# Patient Record
Sex: FEMALE | Race: BLACK OR AFRICAN AMERICAN | NOT HISPANIC OR LATINO | Employment: FULL TIME | URBAN - METROPOLITAN AREA
[De-identification: names, ages, dates, MRNs, and addresses within clinical notes are randomized per-mention and may not be internally consistent; named-entity substitution may affect disease eponyms.]

---

## 2017-08-15 ENCOUNTER — OFFICE VISIT (OUTPATIENT)
Dept: URGENT CARE | Facility: CLINIC | Age: 22
End: 2017-08-15
Payer: COMMERCIAL

## 2017-08-15 VITALS
SYSTOLIC BLOOD PRESSURE: 122 MMHG | OXYGEN SATURATION: 98 % | BODY MASS INDEX: 42.64 KG/M2 | HEIGHT: 65 IN | HEART RATE: 103 BPM | WEIGHT: 255.94 LBS | DIASTOLIC BLOOD PRESSURE: 70 MMHG | TEMPERATURE: 98 F

## 2017-08-15 DIAGNOSIS — L03.116 CELLULITIS OF LEFT LOWER EXTREMITY: Primary | ICD-10-CM

## 2017-08-15 PROCEDURE — 99214 OFFICE O/P EST MOD 30 MIN: CPT | Mod: S$GLB,,, | Performed by: NURSE PRACTITIONER

## 2017-08-15 PROCEDURE — 99999 PR PBB SHADOW E&M-EST. PATIENT-LVL III: CPT | Mod: PBBFAC,,,

## 2017-08-15 PROCEDURE — 3008F BODY MASS INDEX DOCD: CPT | Mod: S$GLB,,, | Performed by: NURSE PRACTITIONER

## 2017-08-15 RX ORDER — CEPHALEXIN 500 MG/1
500 TABLET ORAL 3 TIMES DAILY
Qty: 21 TABLET | Refills: 0 | Status: SHIPPED | OUTPATIENT
Start: 2017-08-15 | End: 2017-08-22

## 2017-08-15 RX ORDER — MUPIROCIN 20 MG/G
OINTMENT TOPICAL 3 TIMES DAILY
Qty: 15 G | Refills: 0 | Status: SHIPPED | OUTPATIENT
Start: 2017-08-15 | End: 2017-09-25

## 2017-08-15 NOTE — LETTER
August 15, 2017                 [facility]  Urgent Care  [address] [place] [address]  Phone: [phone]  Fax: [phone]   August 15, 2017     Patient: iMrian Carbajal   YOB: 1995   Date of Visit: 8/15/2017       To Whom it May Concern:    Mirian Carbajal was seen in my clinic on 8/15/2017. She may return on 8/16/2017.  If you have any questions or concerns, please don't hesitate to call.    Sincerely,         Patito Ashford NP

## 2017-08-15 NOTE — PROGRESS NOTES
Subjective:       Patient ID: Mirian Carbajal is a 21 y.o. female.    Chief Complaint: Insect Bite    Patient presents with erythema to left lower leg.  Has bite to dorsal left foot.  Reported reported seeing insect to area of the foot when she was bit.  Described insect to be possibly a very small spider.  Did not see anything to left lower leg/ankle.  Reported that she shaved on Sunday with new razor.  Reported also walking through a cemetary to visit family member's grave site.        Insect Bite   This is a new problem. The current episode started in the past 7 days. The problem occurs constantly. The problem has been unchanged. Associated symptoms include a rash. Pertinent negatives include no chills, congestion, coughing, fatigue, headaches, joint swelling, myalgias, sore throat or swollen glands. She has tried nothing for the symptoms.     Review of Systems   Constitutional: Negative for chills and fatigue.   HENT: Negative for congestion and sore throat.    Respiratory: Negative for cough.    Musculoskeletal: Negative for joint swelling and myalgias.   Skin: Positive for color change and rash.   Neurological: Negative for dizziness and headaches.   Psychiatric/Behavioral: Negative for agitation and confusion.       Objective:      Physical Exam   Constitutional: She is oriented to person, place, and time. Vital signs are normal. She appears well-developed and well-nourished.   HENT:   Head: Normocephalic and atraumatic.   Neck: Normal range of motion.   Cardiovascular: Normal rate.    Pulmonary/Chest: Effort normal.   Musculoskeletal: Normal range of motion. She exhibits tenderness.   Neurological: She is alert and oriented to person, place, and time.   Skin: Skin is warm. There is erythema.        Erythematous area noted to left lower extremity (12.0 x 7 cm).  Skin intact.  No drainage.  Warm to palpation.  Another small area noted to dorsal left foot with dark center.  Min erythema noted.     Patient has picture from today to compare (if needed).  [facility]    Psychiatric: She has a normal mood and affect. Her behavior is normal.       Assessment:       1. Cellulitis of left lower extremity        Plan:         Cellulitis of left lower extremity  -     cephalexin (KEFTAB) 500 mg tablet; Take 1 tablet (500 mg total) by mouth 3 (three) times daily.  Dispense: 21 tablet; Refill: 0  -     mupirocin (BACTROBAN) 2 % ointment; Apply topically 3 (three) times daily.  Dispense: 15 g; Refill: 0        Instructed to take all medications as ordered.  Informed if no improvement or symptoms worsens to follow up with primary care physician.  Informed to monitor for worsening signs.  Printed and review after visit summary with patient.

## 2017-08-15 NOTE — PATIENT INSTRUCTIONS
Cellulitis  Cellulitis is an infection of the deep layers of skin. A break in the skin, such as a cut or scratch, can let bacteria under the skin. If the bacteria get to deep layers of the skin, it can be serious. If not treated, cellulitis can get into the bloodstream and lymph nodes. The infection can then spread throughout the body. This causes serious illness.  Cellulitis causes the affected skin to become red, swollen, warm, and sore. The reddened areas have a visible border. An open sore may leak fluid (pus). You may have a fever, chills, and pain.  Cellulitis is treated with antibiotics taken for 7 to 10 days. An open sore may be cleaned and covered with cool wet gauze. Symptoms should get better 1 to 2 days after treatment is started. Make sure to take all the antibiotics for the full number of days until they are gone. Keep taking the medicine even if your symptoms go away.  Home care  Follow these tips:  · Limit the use of the part of your body with cellulitis.   · If the infection is on your leg, keep your leg raised while sitting. This will help to reduce swelling.  · Take all of the antibiotic medicine exactly as directed until it is gone. Do not miss any doses, especially during the first 7 days. Dont stop taking the medicine when your symptoms get better.  · Keep the affected area clean and dry.  · Wash your hands with soap and warm water before and after touching your skin. Anyone else who touches your skin should also wash his or her hands. Don't share towels.  Follow-up care  Follow up with your healthcare provider, or as advised. If your infection does not go away on the first antibiotic, your healthcare provider will prescribe a different one.  When to seek medical advice  Call your healthcare provider right away if any of these occur:  · Red areas that spread  · Swelling or pain that gets worse  · Fluid leaking from the skin (pus)  · Fever higher of 100.4º F (38.0º C) or higher after 2 days  on antibiotics  Date Last Reviewed: 9/1/2016  © 0565-8395 [employer]. [address] [place] [address], [place] [address]. All rights reserved. This information is not intended as a substitute for professional medical care. Always follow your healthcare professional's instructions.

## 2017-09-25 ENCOUNTER — OFFICE VISIT (OUTPATIENT)
Dept: INTERNAL MEDICINE | Facility: CLINIC | Age: 22
End: 2017-09-25
Payer: COMMERCIAL

## 2017-09-25 VITALS
HEIGHT: 65 IN | SYSTOLIC BLOOD PRESSURE: 130 MMHG | OXYGEN SATURATION: 98 % | TEMPERATURE: 98 F | BODY MASS INDEX: 43.16 KG/M2 | WEIGHT: 259.06 LBS | DIASTOLIC BLOOD PRESSURE: 66 MMHG | HEART RATE: 88 BPM

## 2017-09-25 DIAGNOSIS — N92.6 ABNORMAL MENSTRUAL CYCLE: ICD-10-CM

## 2017-09-25 DIAGNOSIS — R10.9 ABDOMINAL PAIN, UNSPECIFIED LOCATION: ICD-10-CM

## 2017-09-25 DIAGNOSIS — R42 DIZZINESS: ICD-10-CM

## 2017-09-25 DIAGNOSIS — R51.9 HEADACHE, UNSPECIFIED HEADACHE TYPE: ICD-10-CM

## 2017-09-25 DIAGNOSIS — Z00.00 EXAMINATION: Primary | ICD-10-CM

## 2017-09-25 PROCEDURE — 99999 PR PBB SHADOW E&M-EST. PATIENT-LVL IV: CPT | Mod: PBBFAC,,, | Performed by: NURSE PRACTITIONER

## 2017-09-25 PROCEDURE — 3008F BODY MASS INDEX DOCD: CPT | Mod: S$GLB,,, | Performed by: NURSE PRACTITIONER

## 2017-09-25 PROCEDURE — 99395 PREV VISIT EST AGE 18-39: CPT | Mod: S$GLB,,, | Performed by: NURSE PRACTITIONER

## 2017-09-25 RX ORDER — IBUPROFEN 800 MG/1
800 TABLET ORAL 3 TIMES DAILY PRN
Qty: 30 TABLET | Refills: 0 | Status: SHIPPED | OUTPATIENT
Start: 2017-09-25 | End: 2017-10-05

## 2017-09-25 NOTE — PROGRESS NOTES
Subjective:       Patient ID: Mirian Carbajal is a 22 y.o. female.    Chief Complaint: check up, comlete Bloodwork and referral to OB/GYN    Patient presents for exam and labs.  Reports that she has not been feeling well.  Has been having some fatigue and dizziness.  Has history of headaches but yes that they have been more frequent lately.  Last year she had MRI done at [facility] and was negative per patient.  Has seen neurology at [facility] about 2 years ago.  Reports she was giving some medication by mouth that did not improve symptoms.  Did not return for follow up.  Reports she has trouble staying asleep during the night.  Unaware if she snores.  No problem falling asleep.  Her mother-in-law and father-in-law passed away in the last few months.  Denies any other stressful situations.        Review of Systems   Constitutional: Positive for unexpected weight change. Negative for activity change.   HENT: Negative for hearing loss, rhinorrhea and trouble swallowing.    Eyes: Negative for discharge and visual disturbance.   Respiratory: Negative for chest tightness and wheezing.    Cardiovascular: Positive for palpitations. Negative for chest pain.   Gastrointestinal: Positive for constipation and diarrhea. Negative for blood in stool and vomiting.   Endocrine: Positive for polyuria. Negative for polydipsia.   Genitourinary: Positive for menstrual problem (abnormal cycles). Negative for difficulty urinating, dysuria and hematuria.   Musculoskeletal: Positive for arthralgias, joint swelling and neck pain.   Neurological: Positive for weakness and headaches.   Psychiatric/Behavioral: Positive for dysphoric mood. Negative for confusion.       Objective:      Physical Exam   Constitutional: She is oriented to person, place, and time. Vital signs are normal. She appears well-developed and well-nourished.   HENT:   Head: Normocephalic and atraumatic.   Right Ear: Hearing, tympanic membrane,  external ear and ear canal normal.   Left Ear: Hearing, tympanic membrane, external ear and ear canal normal.   Nose: Nose normal. Right sinus exhibits no maxillary sinus tenderness and no frontal sinus tenderness. Left sinus exhibits no maxillary sinus tenderness and no frontal sinus tenderness.   Mouth/Throat: Uvula is midline and oropharynx is clear and moist.   Neck: Normal range of motion.   Cardiovascular: Normal rate and regular rhythm.    Pulses:       Radial pulses are 2+ on the right side, and 2+ on the left side.        Dorsalis pedis pulses are 2+ on the right side, and 2+ on the left side.   Pulmonary/Chest: Effort normal and breath sounds normal.   Abdominal: Soft. Bowel sounds are normal. There is tenderness in the right lower quadrant and periumbilical area.   Musculoskeletal: Normal range of motion.   Neurological: She is alert and oriented to person, place, and time.   Skin: Skin is warm and dry. No erythema.   Psychiatric: She has a normal mood and affect. Her behavior is normal.       Assessment:       1. Examination    2. Headache, unspecified headache type    3. Dizziness    4. Abdominal pain, unspecified location    5. Abnormal menstrual cycle        Plan:         Examination  -     CBC auto differential; Future; Expected date: 09/25/2017  -     Comprehensive metabolic panel; Future; Expected date: 09/25/2017  -     TSH; Future; Expected date: 09/25/2017  -     Vitamin D; Future; Expected date: 09/25/2017  -     Lipid panel; Future; Expected date: 09/25/2017  -     Hemoglobin A1c; Future; Expected date: 09/25/2017  -     Urinalysis; Future; Expected date: 09/25/2017  -     PREGNANCY TEST, URINE RAPID; Future; Expected date: 09/25/2017  -     Ambulatory referral to Obstetrics / Gynecology    Headache, unspecified headache type  -     CBC auto differential; Future; Expected date: 09/25/2017  -     Comprehensive metabolic panel; Future; Expected date: 09/25/2017  -     TSH; Future; Expected date:  09/25/2017  -     Vitamin D; Future; Expected date: 09/25/2017  -     ibuprofen (ADVIL,MOTRIN) 800 MG tablet; Take 1 tablet (800 mg total) by mouth 3 (three) times daily as needed for Pain.  Dispense: 30 tablet; Refill: 0    Dizziness  -     CBC auto differential; Future; Expected date: 09/25/2017  -     Comprehensive metabolic panel; Future; Expected date: 09/25/2017  -     TSH; Future; Expected date: 09/25/2017  -     Vitamin D; Future; Expected date: 09/25/2017    Abdominal pain, unspecified location  -     Urinalysis; Future; Expected date: 09/25/2017  -     PREGNANCY TEST, URINE RAPID; Future; Expected date: 09/25/2017    Abnormal menstrual cycle  -     Ambulatory referral to Obstetrics / Gynecology      Instructed to take all medications as ordered.  Informed if no improvement or symptoms worsens to follow up with primary care physician.  Will inform of lab reports.  Will get appointment to see GYN and Pulmonary.  Printed and review after visit summary with patient.

## 2017-09-26 ENCOUNTER — LAB VISIT (OUTPATIENT)
Dept: LAB | Facility: HOSPITAL | Age: 22
End: 2017-09-26
Attending: NURSE PRACTITIONER
Payer: COMMERCIAL

## 2017-09-26 DIAGNOSIS — Z00.00 EXAMINATION: ICD-10-CM

## 2017-09-26 DIAGNOSIS — R42 DIZZINESS: ICD-10-CM

## 2017-09-26 DIAGNOSIS — R51.9 HEADACHE, UNSPECIFIED HEADACHE TYPE: ICD-10-CM

## 2017-09-26 LAB
25(OH)D3+25(OH)D2 SERPL-MCNC: 11 NG/ML
ALBUMIN SERPL BCP-MCNC: 3.4 G/DL
ALP SERPL-CCNC: 71 U/L
ALT SERPL W/O P-5'-P-CCNC: 35 U/L
ANION GAP SERPL CALC-SCNC: 8 MMOL/L
AST SERPL-CCNC: 46 U/L
BASOPHILS # BLD AUTO: 0.03 K/UL
BASOPHILS NFR BLD: 0.5 %
BILIRUB SERPL-MCNC: 0.2 MG/DL
BUN SERPL-MCNC: 15 MG/DL
CALCIUM SERPL-MCNC: 8.8 MG/DL
CHLORIDE SERPL-SCNC: 107 MMOL/L
CHOLEST SERPL-MCNC: 182 MG/DL
CHOLEST/HDLC SERPL: 5.4 {RATIO}
CO2 SERPL-SCNC: 24 MMOL/L
CREAT SERPL-MCNC: 0.9 MG/DL
DIFFERENTIAL METHOD: ABNORMAL
EOSINOPHIL # BLD AUTO: 0.3 K/UL
EOSINOPHIL NFR BLD: 4.8 %
ERYTHROCYTE [DISTWIDTH] IN BLOOD BY AUTOMATED COUNT: 15.2 %
EST. GFR  (AFRICAN AMERICAN): >60 ML/MIN/1.73 M^2
EST. GFR  (NON AFRICAN AMERICAN): >60 ML/MIN/1.73 M^2
ESTIMATED AVG GLUCOSE: 114 MG/DL
GLUCOSE SERPL-MCNC: 80 MG/DL
HBA1C MFR BLD HPLC: 5.6 %
HCT VFR BLD AUTO: 34.2 %
HDLC SERPL-MCNC: 34 MG/DL
HDLC SERPL: 18.7 %
HGB BLD-MCNC: 10.8 G/DL
LDLC SERPL CALC-MCNC: 123.4 MG/DL
LYMPHOCYTES # BLD AUTO: 2.1 K/UL
LYMPHOCYTES NFR BLD: 34.7 %
MCH RBC QN AUTO: 24.6 PG
MCHC RBC AUTO-ENTMCNC: 31.6 G/DL
MCV RBC AUTO: 78 FL
MONOCYTES # BLD AUTO: 0.5 K/UL
MONOCYTES NFR BLD: 8.6 %
NEUTROPHILS # BLD AUTO: 3.1 K/UL
NEUTROPHILS NFR BLD: 50.7 %
NONHDLC SERPL-MCNC: 148 MG/DL
PLATELET # BLD AUTO: 391 K/UL
PMV BLD AUTO: 9.7 FL
POTASSIUM SERPL-SCNC: 4.3 MMOL/L
PROT SERPL-MCNC: 7.3 G/DL
RBC # BLD AUTO: 4.39 M/UL
SODIUM SERPL-SCNC: 139 MMOL/L
TRIGL SERPL-MCNC: 123 MG/DL
TSH SERPL DL<=0.005 MIU/L-ACNC: 2.34 UIU/ML
WBC # BLD AUTO: 6.03 K/UL

## 2017-09-26 PROCEDURE — 80053 COMPREHEN METABOLIC PANEL: CPT

## 2017-09-26 PROCEDURE — 85025 COMPLETE CBC W/AUTO DIFF WBC: CPT

## 2017-09-26 PROCEDURE — 82306 VITAMIN D 25 HYDROXY: CPT

## 2017-09-26 PROCEDURE — 83036 HEMOGLOBIN GLYCOSYLATED A1C: CPT

## 2017-09-26 PROCEDURE — 84443 ASSAY THYROID STIM HORMONE: CPT

## 2017-09-26 PROCEDURE — 36415 COLL VENOUS BLD VENIPUNCTURE: CPT | Mod: PO

## 2017-09-26 PROCEDURE — 80061 LIPID PANEL: CPT

## 2017-09-27 ENCOUNTER — TELEPHONE (OUTPATIENT)
Dept: INTERNAL MEDICINE | Facility: CLINIC | Age: 22
End: 2017-09-27

## 2017-09-27 DIAGNOSIS — E55.9 VITAMIN D INSUFFICIENCY: Primary | ICD-10-CM

## 2017-09-27 RX ORDER — ERGOCALCIFEROL 1.25 MG/1
50000 CAPSULE ORAL
Qty: 8 CAPSULE | Refills: 0 | Status: SHIPPED | OUTPATIENT
Start: 2017-09-27 | End: 2017-11-16

## 2017-09-27 NOTE — TELEPHONE ENCOUNTER
----- Message from Melida Crow LPN sent at 9/27/2017  1:29 PM CDT -----  Contact: Pt      ----- Message -----  From: Yakov Davison  Sent: 9/27/2017   1:27 PM  To: Dejon Caputo Staff    Pt is returning a missed call.  Please advise [phone] (home)

## 2017-10-05 ENCOUNTER — LAB VISIT (OUTPATIENT)
Dept: LAB | Facility: HOSPITAL | Age: 22
End: 2017-10-05
Attending: OBSTETRICS & GYNECOLOGY
Payer: COMMERCIAL

## 2017-10-05 ENCOUNTER — OFFICE VISIT (OUTPATIENT)
Dept: OBSTETRICS AND GYNECOLOGY | Facility: CLINIC | Age: 22
End: 2017-10-05
Payer: COMMERCIAL

## 2017-10-05 VITALS
WEIGHT: 259.5 LBS | HEIGHT: 65 IN | DIASTOLIC BLOOD PRESSURE: 70 MMHG | BODY MASS INDEX: 43.24 KG/M2 | SYSTOLIC BLOOD PRESSURE: 120 MMHG

## 2017-10-05 DIAGNOSIS — Z12.4 PAP SMEAR FOR CERVICAL CANCER SCREENING: Primary | ICD-10-CM

## 2017-10-05 DIAGNOSIS — Z11.3 SCREEN FOR STD (SEXUALLY TRANSMITTED DISEASE): ICD-10-CM

## 2017-10-05 DIAGNOSIS — N92.6 IRREGULAR MENSES: ICD-10-CM

## 2017-10-05 LAB
FSH SERPL-ACNC: 7.1 MIU/ML
PROLACTIN SERPL IA-MCNC: 4.8 NG/ML

## 2017-10-05 PROCEDURE — 83001 ASSAY OF GONADOTROPIN (FSH): CPT

## 2017-10-05 PROCEDURE — 87591 N.GONORRHOEAE DNA AMP PROB: CPT

## 2017-10-05 PROCEDURE — 99999 PR PBB SHADOW E&M-EST. PATIENT-LVL III: CPT | Mod: PBBFAC,,, | Performed by: OBSTETRICS & GYNECOLOGY

## 2017-10-05 PROCEDURE — 88175 CYTOPATH C/V AUTO FLUID REDO: CPT | Performed by: PATHOLOGY

## 2017-10-05 PROCEDURE — 84146 ASSAY OF PROLACTIN: CPT

## 2017-10-05 PROCEDURE — 99385 PREV VISIT NEW AGE 18-39: CPT | Mod: S$GLB,,, | Performed by: OBSTETRICS & GYNECOLOGY

## 2017-10-05 PROCEDURE — 81025 URINE PREGNANCY TEST: CPT | Mod: S$GLB,,, | Performed by: OBSTETRICS & GYNECOLOGY

## 2017-10-05 PROCEDURE — 87624 HPV HI-RISK TYP POOLED RSLT: CPT

## 2017-10-05 PROCEDURE — 36415 COLL VENOUS BLD VENIPUNCTURE: CPT | Mod: PO

## 2017-10-05 PROCEDURE — 88141 CYTOPATH C/V INTERPRET: CPT | Mod: ,,, | Performed by: PATHOLOGY

## 2017-10-05 RX ORDER — MEDROXYPROGESTERONE ACETATE 10 MG/1
10 TABLET ORAL DAILY
Qty: 10 TABLET | Refills: 0 | Status: SHIPPED | OUTPATIENT
Start: 2017-10-05 | End: 2018-12-06

## 2017-10-05 RX ORDER — NORGESTIMATE AND ETHINYL ESTRADIOL 0.25-0.035
1 KIT ORAL DAILY
Qty: 30 TABLET | Refills: 11 | Status: SHIPPED | OUTPATIENT
Start: 2017-10-05 | End: 2018-12-06

## 2017-10-05 NOTE — PROGRESS NOTES
Subjective:       Patient ID: Mirian Carbajal is a 22 y.o. female.    Chief Complaint:  heavy menstrual and Gynecologic Exam      History of Present Illness  HPI  Annual Exam-Premenopausal  Patient presents for annual exam. The patient has no complaints today. The patient is sexually active with .  Has not been using any BC for 2 years.  Is not trying for pregnancy. GYN screening history: no prior history of gyn screening tests. The patient wears seatbelts: yes. The patient participates in regular exercise: yes. Has the patient ever been transfused or tattooed?: no. The patient reports that there is not domestic violence in her life.        Dysfunctional Uterine Bleeding  Patient complains of irregular menses. She had been bleeding irregularly and frequently skips months at a time. She is now bleeding every 2-3 months and menses are lasting 14-30 days. She changes her pad or tampon every 2 hours.  Dysmenorrhea:moderate, occurring throughout menses. Cyclic symptoms include: none. Current contraception: none. History of infertility: no. History of abnormal Pap smear: no.         GYN & OB HistoryPatient's last menstrual period was 07/02/2017 (approximate).   Date of Last Pap: No result found    OB History   No data available       Review of Systems  Review of Systems   Constitutional: Negative for activity change, appetite change, fatigue, fever and unexpected weight change.   Respiratory: Negative for shortness of breath.    Cardiovascular: Negative for chest pain, palpitations and leg swelling.   Gastrointestinal: Negative for abdominal pain, constipation, diarrhea, nausea and vomiting.   Genitourinary: Positive for menorrhagia and menstrual problem. Negative for dyspareunia, dysuria, frequency, genital sores, hematuria, pelvic pain, vaginal bleeding, vaginal discharge, vaginal pain, dysmenorrhea and vaginal odor.   Musculoskeletal: Negative for back pain.   Neurological: Negative for syncope and  headaches.   Breast: Negative for breast mass, breast pain, nipple discharge and skin changes          Objective:    Physical Exam:   Constitutional: She is oriented to person, place, and time. She appears well-developed and well-nourished. No distress.    HENT:   Head: Normocephalic and atraumatic.    Eyes: EOM are normal. Pupils are equal, round, and reactive to light.    Neck: Normal range of motion. Neck supple.    Cardiovascular: Normal rate, regular rhythm and normal heart sounds.     Pulmonary/Chest: Effort normal and breath sounds normal.        Abdominal: Soft. Bowel sounds are normal. She exhibits no distension. There is no tenderness.     Genitourinary: Vagina normal and uterus normal. Pelvic exam was performed with patient supine. There is no rash, tenderness, lesion or injury on the right labia. There is no rash, tenderness, lesion or injury on the left labia. Uterus is not deviated, not enlarged and not tender. Cervix is normal. Right adnexum displays no mass, no tenderness and no fullness. Left adnexum displays no mass, no tenderness and no fullness. No erythema, tenderness or bleeding in the vagina. No foreign body in the vagina. No signs of injury around the vagina. No vaginal discharge found. Cervix exhibits no motion tenderness, no discharge and no friability. Additional cervical findings: pap smear done  Genitourinary Comments: UPT today Negative           Musculoskeletal: Normal range of motion and moves all extremeties. She exhibits no edema or tenderness.       Neurological: She is alert and oriented to person, place, and time.    Skin: Skin is warm and dry.    Psychiatric: She has a normal mood and affect. Her behavior is normal. Thought content normal.          Assessment:        1. Pap smear for cervical cancer screening    2. Screen for STD (sexually transmitted disease)    3. Irregular menses             Plan:      Pap smear for cervical cancer screening  -     Liquid-based pap smear,  screening  -     Pt was counseled on cervical/vaginal screening guidelines and recommendations.  If today's pap smear result is negative, next pap smear will be due in 2020.  -     Pt was advised on current breast cancer screening recommendations.    -     Follow up with PCP for routine health maintenance needs.    Screen for STD (sexually transmitted disease)  -     C. trachomatis/N. gonorrhoeae by AMP DNA Cervix    Irregular menses  -     FOLLICLE STIMULATING HORMONE; Future; Expected date: 10/05/2017  -     Prolactin; Future; Expected date: 10/05/2017  -     POCT urine pregnancy  -     Clinical presentation suggestive of chronic anovulation/PCOS.  Pt was counseled on PCOS and on treatment options, including associated risks and benefits of each.  Pt voiced understanding and desires to proceed with Provera challenge followed by OCP.  Medication dosing, side-effects, risks, benefits, and alternatives were discussed.  Medical history was reviewed and pt is a candidate for OCP use.  -     medroxyPROGESTERone (PROVERA) 10 MG tablet; Take 1 tablet (10 mg total) by mouth once daily.  Dispense: 10 tablet; Refill: 0  -     norgestimate-ethinyl estradiol (ORTHO-CYCLEN) 0.25-35 mg-mcg per tablet; Take 1 tablet by mouth once daily.  Dispense: 30 tablet; Refill: 11  -     Pt counseled on link between obesity and chronic anovulation/PCOS.  Recommend healthy diet and exercise with goal 20% weight loss.        Return in about 3 months (around 1/5/2018).

## 2017-10-05 NOTE — PATIENT INSTRUCTIONS
Polycystic Ovary Syndrome  Polycystic ovary syndrome (PCOS) causes harmless, small cysts in the ovaries and other symptoms. PCOS is caused by certain hormones being out of balance. The word syndrome means a group of symptoms. Women with PCOS may have no periods, irregular periods, or very long periods.    Your ovaries  Women store their eggs in their ovaries. Each egg is in a capsule called a follicle. Normally during the reproductive years, one follicle grows to produce a mature egg each month. This egg is released during ovulation and the follicle dissolves.  Hormones out of balance  With polycystic ovary syndrome (PCOS), the hormones that control ovulation are out of balance. These include estrogen, progesterones, and androgen. As a result, ovulation may not occur. Instead, the follicle stays enlarged. This is a fluid-filled sac (cyst). Over time, the ovaries fill with many small cysts. This is why they are called poly (many) cystic ovaries. In some women, the ovaries also make too much androgen.  PCOS symptoms  Women with PCOS may also have one or more of these symptoms:  · Trouble getting pregnant (fertility problems)  · Weight gain, especially around the waist   · Acne  · Hair growth on the face and other parts of the body  · Patches of thickened, velvety, darkened skin called acanthosis nigricans  Women with PCOS are also at increased risk of developing cancer of the uterine lining, diabetes, and heart disease.   Date Last Reviewed: 5/10/2015  © 1696-6320 [employer]. [address] [place] [address]. All rights reserved. This information is not intended as a substitute for professional medical care. Always follow your healthcare professional's instructions.        Birth Control: The Pill    Birth control pills contain hormones that help prevent pregnancy. The pills are prescribed by your healthcare provider. There are many types of birth control pills available. If you have  side effects from one type of pill, tell your healthcare provider. He or she may be able to prescribe a pill that works better for you.  Pregnancy rates  Talk to your healthcare provider about the effectiveness of this birth control method.  Using the pill  · Take one pill daily. Take it at around the same time each day.  · Follow your healthcare providers guidelines on when to start your first pack of pills. You may need to use another form of birth control for a week or more after you start.  · Know what to do if you forget to take a pill. (Consult your healthcare provider or check the package.) If you miss more than one pill, you may need to use a backup method of birth control for a week or more.  Pros  · Low pregnancy rate  · No interruption to sex  · Easy to use  · Can help make periods more regular  · May lower your risk of ovarian cysts and certain cancers  · May decrease menstrual cramps, menstrual flow, and acne  Cons  · Does not protect against sexually transmitted infection (STIs)  · Requires taking a pill on time each day  · May not work as well when taken with certain other medicines (check with your pharmacist)  · May cause side effects such as nausea, irregular bleeding, headaches, breast tenderness, fatigue, or mood changes (these often go away within 3 months)  · May increase the risk of blood clots, heart attack, and stroke  The pill may not be for you  The pill may not be for you if:  · You are a smoker and over age 35  · You have high blood pressure or gallbladder, liver, cerebrovascular  or heart disease  · You have diabetes, migraines, blood clot in the vein or artery, lupus, depression, certain lipid disorders, or take medicines that interfere with the pill  In these cases, discuss the risks with your healthcare provider.  Date Last Reviewed: 3/1/2017  © 9420-0309 [employer]. [address] [place] [address], [place] [address]. All rights reserved. This information is not intended as a  substitute for professional medical care. Always follow your healthcare professional's instructions.

## 2017-10-05 NOTE — LETTER
October 5, 2017      Patito Ashford, ANGELES  [address] [facility] [address] [place] [address]           [facility] - OB/ GYN  [address] [place] [address]  Phone: [phone]  Fax: [phone]          Patient: Mriian Carbajal   MR Number: [id number]   YOB: 1995   Date of Visit: 10/5/2017       Dear Patito Ashford:    Thank you for referring Mirian Carbajal to me for evaluation. Attached you will find relevant portions of my assessment and plan of care.    If you have questions, please do not hesitate to call me. I look forward to following Mirian Carbajal along with you.    Sincerely,    Fawad Moreira MD    Enclosure  CC:  No Recipients    If you would like to receive this communication electronically, please contact [email or web address] or [phone] to request more information on [employer] access.    For providers and/or their staff who would like to refer a patient to [facility], please contact us through our one-stop-shop provider referral line, [facility], at [phone].    If you feel you have received this communication in error or would no longer like to receive these types of communications, please e-mail [email or web address]

## 2017-10-06 LAB
C TRACH DNA SPEC QL NAA+PROBE: NOT DETECTED
N GONORRHOEA DNA SPEC QL NAA+PROBE: NOT DETECTED

## 2017-10-09 ENCOUNTER — TELEPHONE (OUTPATIENT)
Dept: OBSTETRICS AND GYNECOLOGY | Facility: CLINIC | Age: 22
End: 2017-10-09

## 2017-10-09 NOTE — TELEPHONE ENCOUNTER
Returned patient call regarding how to take medications. Per Dr. Moreira's progress note; patient is to take Provera for 10 days then once finished to start the OCP. Patient verbalized understanding. Patient was instructed to call the clinic back if she had any other questions or concerns.

## 2017-10-12 LAB
HPV HR 12 DNA CVX QL NAA+PROBE: POSITIVE
HPV16 DNA SPEC QL NAA+PROBE: NEGATIVE
HPV18 DNA SPEC QL NAA+PROBE: NEGATIVE

## 2017-10-13 ENCOUNTER — TELEPHONE (OUTPATIENT)
Dept: OBSTETRICS AND GYNECOLOGY | Facility: CLINIC | Age: 22
End: 2017-10-13

## 2017-10-13 NOTE — TELEPHONE ENCOUNTER
----- Message from Jacquelyn Davison sent at 10/13/2017 10:30 AM CDT -----  Patient states she have questions regarding her test results. Please adv/call [phone].//thanks. cw

## 2017-10-13 NOTE — TELEPHONE ENCOUNTER
Patient got her pap smear result on my [facility], she wanted clarification on her HPV level.  Please clarify.  Also should she repeat pap smear next year or Colposcopy?

## 2017-10-13 NOTE — TELEPHONE ENCOUNTER
Called and spoke with pt.  Reviewed pap results (ASCUS HPV +).  As per current guidelines, recommend repeating pap in 12 months.  Pt was counseled on result, HPV, and recommendations.  Pt voiced understanding and questions were answered.

## 2017-10-19 ENCOUNTER — TELEPHONE (OUTPATIENT)
Dept: OBSTETRICS AND GYNECOLOGY | Facility: CLINIC | Age: 22
End: 2017-10-19

## 2017-10-19 NOTE — TELEPHONE ENCOUNTER
----- Message from Rolly Loomis sent at 10/19/2017  9:30 AM CDT -----  Contact: [phone]   Pt would like to know how to take Birth Control pills.  Pt unsure on how to take pills.  Please call back at [phone]. Thx_AH

## 2017-11-17 ENCOUNTER — TELEPHONE (OUTPATIENT)
Dept: OBSTETRICS AND GYNECOLOGY | Facility: CLINIC | Age: 22
End: 2017-11-17

## 2017-11-17 NOTE — TELEPHONE ENCOUNTER
Patient stated that she took provera for the 10 days that she was suppose to take it, her period then came on after the completion.  She started taking the birth control the Sunday after the period started.  She has been bleeding ever since and wanted to know what she should do.  I informed the patient to continue to take the birth control it takes awhile for her body to adjust to the medication, to continue to watch the bleeding if it continues to be bothersome or changing pads every hour for several hours she should either got to ER or come in for evaluation.  Patient voiced understanding.

## 2017-11-17 NOTE — TELEPHONE ENCOUNTER
----- Message from Josefa Blake sent at 11/17/2017 10:33 AM CST -----  Contact: pt  Please call pt @ [phone], pt have a question.

## 2017-11-18 ENCOUNTER — PATIENT MESSAGE (OUTPATIENT)
Dept: OBSTETRICS AND GYNECOLOGY | Facility: CLINIC | Age: 22
End: 2017-11-18

## 2017-11-20 DIAGNOSIS — N92.6 IRREGULAR MENSES: ICD-10-CM

## 2017-11-20 RX ORDER — NORGESTIMATE AND ETHINYL ESTRADIOL 0.25-0.035
1 KIT ORAL DAILY
Qty: 30 TABLET | Refills: 11 | OUTPATIENT
Start: 2017-11-20 | End: 2018-11-20

## 2018-12-06 ENCOUNTER — LAB VISIT (OUTPATIENT)
Dept: LAB | Facility: HOSPITAL | Age: 23
End: 2018-12-06
Attending: FAMILY MEDICINE
Payer: COMMERCIAL

## 2018-12-06 ENCOUNTER — OFFICE VISIT (OUTPATIENT)
Dept: INTERNAL MEDICINE | Facility: CLINIC | Age: 23
End: 2018-12-06
Payer: COMMERCIAL

## 2018-12-06 VITALS
WEIGHT: 266.56 LBS | DIASTOLIC BLOOD PRESSURE: 68 MMHG | HEART RATE: 78 BPM | HEIGHT: 66 IN | SYSTOLIC BLOOD PRESSURE: 122 MMHG | TEMPERATURE: 97 F | BODY MASS INDEX: 42.84 KG/M2

## 2018-12-06 DIAGNOSIS — Z00.00 ROUTINE GENERAL MEDICAL EXAMINATION AT A HEALTH CARE FACILITY: Primary | ICD-10-CM

## 2018-12-06 DIAGNOSIS — Z00.00 ROUTINE GENERAL MEDICAL EXAMINATION AT A HEALTH CARE FACILITY: ICD-10-CM

## 2018-12-06 DIAGNOSIS — E55.9 VITAMIN D DEFICIENCY: ICD-10-CM

## 2018-12-06 DIAGNOSIS — D57.3 SICKLE CELL TRAIT: ICD-10-CM

## 2018-12-06 LAB
25(OH)D3+25(OH)D2 SERPL-MCNC: 8 NG/ML
ALBUMIN SERPL BCP-MCNC: 3.8 G/DL
ALP SERPL-CCNC: 70 U/L
ALT SERPL W/O P-5'-P-CCNC: 34 U/L
ANION GAP SERPL CALC-SCNC: 9 MMOL/L
AST SERPL-CCNC: 46 U/L
BASOPHILS # BLD AUTO: 0.05 K/UL
BASOPHILS NFR BLD: 0.8 %
BILIRUB SERPL-MCNC: 0.2 MG/DL
BUN SERPL-MCNC: 8 MG/DL
CALCIUM SERPL-MCNC: 9.3 MG/DL
CHLORIDE SERPL-SCNC: 108 MMOL/L
CHOLEST SERPL-MCNC: 190 MG/DL
CHOLEST/HDLC SERPL: 5 {RATIO}
CO2 SERPL-SCNC: 24 MMOL/L
CREAT SERPL-MCNC: 0.8 MG/DL
DIFFERENTIAL METHOD: ABNORMAL
EOSINOPHIL # BLD AUTO: 0.3 K/UL
EOSINOPHIL NFR BLD: 3.8 %
ERYTHROCYTE [DISTWIDTH] IN BLOOD BY AUTOMATED COUNT: 15.5 %
EST. GFR  (AFRICAN AMERICAN): >60 ML/MIN/1.73 M^2
EST. GFR  (NON AFRICAN AMERICAN): >60 ML/MIN/1.73 M^2
ESTIMATED AVG GLUCOSE: 114 MG/DL
GLUCOSE SERPL-MCNC: 76 MG/DL
HBA1C MFR BLD HPLC: 5.6 %
HCT VFR BLD AUTO: 38.1 %
HDLC SERPL-MCNC: 38 MG/DL
HDLC SERPL: 20 %
HGB BLD-MCNC: 12 G/DL
IMM GRANULOCYTES # BLD AUTO: 0.01 K/UL
IMM GRANULOCYTES NFR BLD AUTO: 0.2 %
LDLC SERPL CALC-MCNC: 108.6 MG/DL
LYMPHOCYTES # BLD AUTO: 2.3 K/UL
LYMPHOCYTES NFR BLD: 34.6 %
MCH RBC QN AUTO: 25.2 PG
MCHC RBC AUTO-ENTMCNC: 31.5 G/DL
MCV RBC AUTO: 80 FL
MONOCYTES # BLD AUTO: 0.6 K/UL
MONOCYTES NFR BLD: 9.5 %
NEUTROPHILS # BLD AUTO: 3.4 K/UL
NEUTROPHILS NFR BLD: 51.1 %
NONHDLC SERPL-MCNC: 152 MG/DL
NRBC BLD-RTO: 0 /100 WBC
PLATELET # BLD AUTO: 394 K/UL
PMV BLD AUTO: 9.8 FL
POTASSIUM SERPL-SCNC: 4 MMOL/L
PROT SERPL-MCNC: 7.6 G/DL
RBC # BLD AUTO: 4.76 M/UL
SODIUM SERPL-SCNC: 141 MMOL/L
T4 FREE SERPL-MCNC: 0.76 NG/DL
TRIGL SERPL-MCNC: 217 MG/DL
TSH SERPL DL<=0.005 MIU/L-ACNC: 1.95 UIU/ML
WBC # BLD AUTO: 6.62 K/UL

## 2018-12-06 PROCEDURE — 90670 PCV13 VACCINE IM: CPT | Mod: S$GLB,,, | Performed by: FAMILY MEDICINE

## 2018-12-06 PROCEDURE — 36415 COLL VENOUS BLD VENIPUNCTURE: CPT | Mod: PO

## 2018-12-06 PROCEDURE — 84439 ASSAY OF FREE THYROXINE: CPT

## 2018-12-06 PROCEDURE — 80061 LIPID PANEL: CPT

## 2018-12-06 PROCEDURE — 82306 VITAMIN D 25 HYDROXY: CPT

## 2018-12-06 PROCEDURE — 80053 COMPREHEN METABOLIC PANEL: CPT

## 2018-12-06 PROCEDURE — 84443 ASSAY THYROID STIM HORMONE: CPT

## 2018-12-06 PROCEDURE — 90686 IIV4 VACC NO PRSV 0.5 ML IM: CPT | Mod: S$GLB,,, | Performed by: FAMILY MEDICINE

## 2018-12-06 PROCEDURE — 99999 PR PBB SHADOW E&M-EST. PATIENT-LVL III: CPT | Mod: PBBFAC,,, | Performed by: FAMILY MEDICINE

## 2018-12-06 PROCEDURE — 90472 IMMUNIZATION ADMIN EACH ADD: CPT | Mod: S$GLB,,, | Performed by: FAMILY MEDICINE

## 2018-12-06 PROCEDURE — 85025 COMPLETE CBC W/AUTO DIFF WBC: CPT

## 2018-12-06 PROCEDURE — 90471 IMMUNIZATION ADMIN: CPT | Mod: S$GLB,,, | Performed by: FAMILY MEDICINE

## 2018-12-06 PROCEDURE — 83036 HEMOGLOBIN GLYCOSYLATED A1C: CPT

## 2018-12-06 PROCEDURE — 99395 PREV VISIT EST AGE 18-39: CPT | Mod: 25,S$GLB,, | Performed by: FAMILY MEDICINE

## 2018-12-06 NOTE — PROGRESS NOTES
Subjective:      Patient ID: Mirian Carbajal is a 23 y.o. female.    Chief Complaint: Establish Care and Annual Exam    Disclaimer:  This note is prepared using voice recognition software and as such is likely to have errors and has not been proof read. Please contact me for questions.     Mirian Carbajal is a 23 y.o. female who presents today to establish care. Works at [employer]. Dr Scarlett enriquez  last year. Needs to schedule.  Is .  Needing to set back up again her next Pap smear.  Does occasionally get vaginal discharge after having intercourse with her  especially after he ejaculates.  Did look this up online and started doing water based douching without since and occasionally a boric acid suppository which has helped.    She recently donated blood and was sent in letter notifying her that she had a sickle cell trait.  She reports that her father has this as well.  She has not had any issues thus far except now she can't donate blood.    She as a child had asthma but has not had any further issues at this time.    Last year when having blood work done was noted be pretty low on her vitamin-D levels.  Was prescribed prescription vitamin-D.  Need to repeat again today.    Is needing a flu shot.  Is also needing pneumonia vaccines due to the sickle cell trait.  Is also potentially overdue for her Tdap but uncertain when her last 1 was.  Believes it was more than 10 years ago.  Originally came from [place].        Lab Results   Component Value Date    WBC 6.03 09/26/2017    HGB 10.8 (L) 09/26/2017    HCT 34.2 (L) 09/26/2017     (H) 09/26/2017    CHOL 182 09/26/2017    TRIG 123 09/26/2017    HDL 34 (L) 09/26/2017    ALT 35 09/26/2017    AST 46 (H) 09/26/2017     09/26/2017    K 4.3 09/26/2017     09/26/2017    CREATININE 0.9 09/26/2017    BUN 15 09/26/2017    CO2 24 09/26/2017    TSH 2.342 09/26/2017    HGBA1C 5.6 09/26/2017       No image results  "found.        Review of Systems   Constitutional: Negative for activity change, appetite change, chills, fatigue and fever.   HENT: Negative for ear pain and trouble swallowing.    Eyes: Negative for pain and visual disturbance.   Respiratory: Negative for cough and shortness of breath.    Cardiovascular: Negative for chest pain and leg swelling.   Gastrointestinal: Negative for abdominal pain, blood in stool, nausea and vomiting.   Endocrine: Negative for cold intolerance and heat intolerance.   Genitourinary: Negative for dysuria, frequency, vaginal bleeding, vaginal discharge and vaginal pain.   Musculoskeletal: Negative for joint swelling, myalgias and neck pain.   Skin: Negative for color change and rash.   Neurological: Negative for dizziness and headaches.   Psychiatric/Behavioral: Negative for behavioral problems and sleep disturbance.     Objective:     Vitals:    12/06/18 1223   BP: 122/68   Pulse: 78   Temp: 97.1 °F (36.2 °C)   TempSrc: Tympanic   Weight: 120.9 kg (266 lb 8.6 oz)   Height: 5' 6" (1.676 m)     Physical Exam   Constitutional: She is oriented to person, place, and time. She appears well-developed and well-nourished.   Morbid obese female   HENT:   Head: Normocephalic and atraumatic.   Right Ear: External ear normal.   Left Ear: External ear normal.   Mouth/Throat: Oropharynx is clear and moist.   Eyes: EOM are normal.   Neck: Normal range of motion. Neck supple. No thyromegaly present.   Cardiovascular: Normal rate and regular rhythm. Exam reveals no gallop and no friction rub.   No murmur heard.  Pulmonary/Chest: Effort normal. No respiratory distress. She has no wheezes. She has no rales.   Abdominal: Soft. Bowel sounds are normal. She exhibits no distension. There is no tenderness. There is no rebound.   Musculoskeletal: Normal range of motion. She exhibits no edema.   Lymphadenopathy:     She has no cervical adenopathy.   Neurological: She is alert and oriented to person, place, and " time.   Skin: Skin is warm and dry. No rash noted.   Psychiatric: She has a normal mood and affect. Her behavior is normal. Judgment and thought content normal.   Vitals reviewed.    Assessment:     1. Routine general medical examination at a health care facility    2. Sickle cell trait    3. Vitamin D deficiency    4. BMI 40.0-44.9, adult      Plan:   Mirian was seen today for establish care and annual exam.    Diagnoses and all orders for this visit:    Routine general medical examination at a health care facility-labs reviewed from prior ordering labs again today for this year's exam.  Update flu shot update Prevnar she will come back in 2 months to receive the Tdap and the Pneumovax.  Will get her sign back up to do her next Pap smear with Gynecology  -     TSH; Future  -     T4, free; Future  -     Lipid panel; Future  -     Hemoglobin A1c; Future  -     Comprehensive metabolic panel; Future  -     CBC auto differential; Future  -     Vitamin D; Future    Sickle cell trait-noted after donating blood needing now pneumonia vaccines  -     TSH; Future  -     T4, free; Future  -     Lipid panel; Future  -     Hemoglobin A1c; Future  -     Comprehensive metabolic panel; Future  -     CBC auto differential; Future  -     Vitamin D; Future    Vitamin D deficiency-previously on prescription supplementation repeating labs today  -     TSH; Future  -     T4, free; Future  -     Lipid panel; Future  -     Hemoglobin A1c; Future  -     Comprehensive metabolic panel; Future  -     CBC auto differential; Future  -     Vitamin D; Future    BMI 40.0-44.9, adult-discussed diet and exercise and weight loss    Other orders  -     (In Office Administered) Pneumococcal Conjugate Vaccine (13 Valent) (IM)  -     Influenza - Quadrivalent (3 years & older) (PF)            Follow-up in about 1 year (around 12/6/2019) for physical with Dr VILLELA.    There are no Patient Instructions on file for this visit.

## 2018-12-08 ENCOUNTER — PATIENT MESSAGE (OUTPATIENT)
Dept: INTERNAL MEDICINE | Facility: CLINIC | Age: 23
End: 2018-12-08

## 2018-12-08 RX ORDER — ERGOCALCIFEROL 1.25 MG/1
50000 CAPSULE ORAL WEEKLY
Qty: 4 CAPSULE | Refills: 11 | Status: SHIPPED | OUTPATIENT
Start: 2018-12-08 | End: 2019-01-07

## 2019-01-30 ENCOUNTER — TELEPHONE (OUTPATIENT)
Dept: URGENT CARE | Facility: CLINIC | Age: 24
End: 2019-01-30

## 2019-05-06 ENCOUNTER — TELEPHONE (OUTPATIENT)
Dept: INTERNAL MEDICINE | Facility: CLINIC | Age: 24
End: 2019-05-06

## 2019-05-06 NOTE — TELEPHONE ENCOUNTER
Called and left message for pt that I could schedule her with Shandra Bunch today here in [place] to please call back and let me know if she can make it or not.

## 2019-05-06 NOTE — TELEPHONE ENCOUNTER
----- Message from Maura Rodriguez sent at 5/6/2019  7:20 AM CDT -----  Contact: patient  Type:  Same Day Appointment Request    Caller is requesting a same day appointment.  Caller declined first available appointment listed below.    Name of Caller: Mirian  When is the first available appointment?no openings  Symptoms:Sinus infection  Best Call Back Number:[phone]  Additional Information:

## 2019-05-07 ENCOUNTER — OFFICE VISIT (OUTPATIENT)
Dept: INTERNAL MEDICINE | Facility: CLINIC | Age: 24
End: 2019-05-07
Payer: COMMERCIAL

## 2019-05-07 ENCOUNTER — PATIENT MESSAGE (OUTPATIENT)
Dept: INTERNAL MEDICINE | Facility: CLINIC | Age: 24
End: 2019-05-07

## 2019-05-07 VITALS
BODY MASS INDEX: 42.55 KG/M2 | HEART RATE: 74 BPM | HEIGHT: 66 IN | SYSTOLIC BLOOD PRESSURE: 118 MMHG | TEMPERATURE: 98 F | WEIGHT: 264.75 LBS | DIASTOLIC BLOOD PRESSURE: 78 MMHG

## 2019-05-07 DIAGNOSIS — J01.90 ACUTE SINUSITIS, RECURRENCE NOT SPECIFIED, UNSPECIFIED LOCATION: Primary | ICD-10-CM

## 2019-05-07 PROCEDURE — 99999 PR PBB SHADOW E&M-EST. PATIENT-LVL III: ICD-10-PCS | Mod: PBBFAC,,, | Performed by: PHYSICIAN ASSISTANT

## 2019-05-07 PROCEDURE — 99213 OFFICE O/P EST LOW 20 MIN: CPT | Mod: S$GLB,,, | Performed by: PHYSICIAN ASSISTANT

## 2019-05-07 PROCEDURE — 3008F BODY MASS INDEX DOCD: CPT | Mod: CPTII,S$GLB,, | Performed by: PHYSICIAN ASSISTANT

## 2019-05-07 PROCEDURE — 3008F PR BODY MASS INDEX (BMI) DOCUMENTED: ICD-10-PCS | Mod: CPTII,S$GLB,, | Performed by: PHYSICIAN ASSISTANT

## 2019-05-07 PROCEDURE — 99999 PR PBB SHADOW E&M-EST. PATIENT-LVL III: CPT | Mod: PBBFAC,,, | Performed by: PHYSICIAN ASSISTANT

## 2019-05-07 PROCEDURE — 99213 PR OFFICE/OUTPT VISIT, EST, LEVL III, 20-29 MIN: ICD-10-PCS | Mod: S$GLB,,, | Performed by: PHYSICIAN ASSISTANT

## 2019-05-07 RX ORDER — FLUTICASONE PROPIONATE 50 MCG
2 SPRAY, SUSPENSION (ML) NASAL DAILY
Qty: 1 BOTTLE | Refills: 0 | Status: SHIPPED | OUTPATIENT
Start: 2019-05-07 | End: 2020-01-14 | Stop reason: SDUPTHER

## 2019-05-07 RX ORDER — AMOXICILLIN 875 MG/1
875 TABLET, FILM COATED ORAL EVERY 12 HOURS
Qty: 20 TABLET | Refills: 0 | Status: SHIPPED | OUTPATIENT
Start: 2019-05-07 | End: 2019-05-17

## 2019-05-07 NOTE — PROGRESS NOTES
"Subjective:       Patient ID: iMrian Carbajal is a 23 y.o. female.    Chief Complaint: Sinusitis (x3 days)    23 year old female c/o sinus sxs X at least 5 days. Pt is new to me. She reports she was sneezing and experiencing congestion but sxs greatly worsened 5 days ago. She reports sinus pressure/pain, dry cough, nasal congestion, and yellow-green rhinorrhea. She reports no sore throat, ear pain, CP, SOB, fever, chills, N/V, rash, or other medical complaints. She has taken Mucinex, Benadryl, Claritin, Tylenol Sinus, and Sudafed without improvement of sxs.    Patient Care Team:  Zoe Alcantar MD as PCP - General (Family Medicine)  Chasity Ching LPN as Care Coordinator (Internal Medicine)    Patient Active Problem List:     Vitamin D deficiency     Sickle cell trait     BMI 40.0-44.9, adult    Review of Systems   Constitutional: Negative for chills and fever.   HENT: Positive for congestion, rhinorrhea, sinus pressure and sneezing. Negative for ear pain and sore throat.    Respiratory: Positive for cough. Negative for shortness of breath.    Cardiovascular: Negative for chest pain, palpitations and leg swelling.   Gastrointestinal: Negative for nausea and vomiting.   Skin: Negative for rash.   Neurological: Negative for dizziness, weakness, numbness and headaches.   Psychiatric/Behavioral: Negative for confusion.       Objective:       Vitals:    05/07/19 1107   BP: 118/78   Pulse: 74   Temp: 97.6 °F (36.4 °C)   TempSrc: Tympanic   Weight: 120.1 kg (264 lb 12.4 oz)   Height: 5' 6" (1.676 m)     Physical Exam   Constitutional: She is oriented to person, place, and time. She appears well-developed and well-nourished. No distress.   HENT:   Head: Normocephalic and atraumatic.   Right Ear: Tympanic membrane and ear canal normal.   Left Ear: Ear canal normal. Tympanic membrane is bulging.   Nose: Right sinus exhibits maxillary sinus tenderness and frontal sinus tenderness. Left sinus exhibits maxillary " sinus tenderness and frontal sinus tenderness.   Mouth/Throat: Oropharynx is clear and moist. No oropharyngeal exudate.   Minimal soft palate erythema; cobblestone appearance of posterior pharynx   Eyes: EOM are normal. No scleral icterus.   Neck: Neck supple.   Cardiovascular: Normal rate and regular rhythm.   Pulmonary/Chest: Effort normal and breath sounds normal. No stridor. No respiratory distress. She has no decreased breath sounds. She has no wheezes. She has no rhonchi. She has no rales.   Musculoskeletal: Normal range of motion. She exhibits no edema.   Lymphadenopathy:     She has no cervical adenopathy.   Neurological: She is alert and oriented to person, place, and time. No cranial nerve deficit.   Skin: Skin is warm and dry. No rash noted.   Psychiatric: She has a normal mood and affect. Her speech is normal and behavior is normal. Thought content normal.       Assessment:       1. Acute sinusitis, recurrence not specified, unspecified location        Plan:         Mirian was seen today for sinusitis.    Diagnoses and all orders for this visit:    Acute sinusitis, recurrence not specified, unspecified location  -     fluticasone propionate (FLONASE) 50 mcg/actuation nasal spray; 2 sprays (100 mcg total) by Each Nare route once daily.  -     amoxicillin (AMOXIL) 875 MG tablet; Take 1 tablet (875 mg total) by mouth every 12 (twelve) hours. for 10 days  Flonase daily. Pt declines steroid shot. Amoxicillin X 10 days. RTC if sxs persist or worsen.    F/u with PCP as recommended for health management.

## 2019-06-08 ENCOUNTER — PATIENT MESSAGE (OUTPATIENT)
Dept: INTERNAL MEDICINE | Facility: CLINIC | Age: 24
End: 2019-06-08

## 2019-10-07 ENCOUNTER — OFFICE VISIT (OUTPATIENT)
Dept: INTERNAL MEDICINE | Facility: CLINIC | Age: 24
End: 2019-10-07
Payer: COMMERCIAL

## 2019-10-07 VITALS
HEART RATE: 82 BPM | TEMPERATURE: 98 F | SYSTOLIC BLOOD PRESSURE: 110 MMHG | BODY MASS INDEX: 40.57 KG/M2 | WEIGHT: 252.44 LBS | HEIGHT: 66 IN | DIASTOLIC BLOOD PRESSURE: 80 MMHG

## 2019-10-07 DIAGNOSIS — K80.50 BILIARY COLIC: ICD-10-CM

## 2019-10-07 DIAGNOSIS — R19.7 DIARRHEA, UNSPECIFIED TYPE: ICD-10-CM

## 2019-10-07 DIAGNOSIS — R10.13 EPIGASTRIC PAIN: Primary | ICD-10-CM

## 2019-10-07 DIAGNOSIS — F41.9 ANXIETY: ICD-10-CM

## 2019-10-07 PROCEDURE — 99999 PR PBB SHADOW E&M-EST. PATIENT-LVL III: CPT | Mod: PBBFAC,,, | Performed by: NURSE PRACTITIONER

## 2019-10-07 PROCEDURE — 3008F BODY MASS INDEX DOCD: CPT | Mod: CPTII,S$GLB,, | Performed by: NURSE PRACTITIONER

## 2019-10-07 PROCEDURE — 3008F PR BODY MASS INDEX (BMI) DOCUMENTED: ICD-10-PCS | Mod: CPTII,S$GLB,, | Performed by: NURSE PRACTITIONER

## 2019-10-07 PROCEDURE — 99999 PR PBB SHADOW E&M-EST. PATIENT-LVL III: ICD-10-PCS | Mod: PBBFAC,,, | Performed by: NURSE PRACTITIONER

## 2019-10-07 PROCEDURE — 99214 PR OFFICE/OUTPT VISIT, EST, LEVL IV, 30-39 MIN: ICD-10-PCS | Mod: S$GLB,,, | Performed by: NURSE PRACTITIONER

## 2019-10-07 PROCEDURE — 99214 OFFICE O/P EST MOD 30 MIN: CPT | Mod: S$GLB,,, | Performed by: NURSE PRACTITIONER

## 2019-10-07 NOTE — PROGRESS NOTES
"Subjective:       Patient ID: Mirian Carbajal is a 24 y.o. female.    Chief Complaint: Abdominal Pain    Patient comes in today with pain to upper abdomen after eating with diarrhea on and off for 5 years. She states that it does not matter what she eats, she gets epigastric pain with diarrhea. Stool is loose/yellow and greasy at times. No fever. She denies burping/belching pain.    She has increased anxiety due to work related stress. Thinks anxiety is playing a role also.    Abdominal Pain   This is a chronic problem. The current episode started more than 1 year ago. The onset quality is gradual. The problem occurs 2 to 4 times per day. The most recent episode lasted 2 hours. The problem has been rapidly worsening. The pain is located in the epigastric region and RUQ. The pain is at a severity of 6/10. The pain is moderate. The quality of the pain is aching and cramping. Associated symptoms include anorexia, diarrhea, nausea and vomiting. Pertinent negatives include no arthralgias, belching, constipation, dysuria, fever, flatus, frequency, headaches, hematochezia, hematuria or melena. The pain is aggravated by nothing and eating. The pain is relieved by being still, certain positions and recumbency. She has tried nothing for the symptoms. The treatment provided no relief. There is no history of abdominal surgery, colon cancer, Crohn's disease, gallstones, GERD, irritable bowel syndrome, pancreatitis, PUD or ulcerative colitis. Patient's medical history includes UTI. Patient's medical history does not include kidney stones.       /80   Pulse 82   Temp 98 °F (36.7 °C)   Ht 5' 6" (1.676 m)   Wt 114.5 kg (252 lb 6.8 oz)   LMP 09/30/2019   BMI 40.74 kg/m²     Review of Systems   Constitutional: Positive for activity change. Negative for appetite change, chills, diaphoresis, fatigue and fever.   HENT: Negative.    Eyes: Negative for visual disturbance.   Respiratory: Negative for cough, shortness of " breath and wheezing.    Cardiovascular: Negative for chest pain, palpitations and leg swelling.   Gastrointestinal: Positive for abdominal pain, anorexia, diarrhea, nausea and vomiting. Negative for abdominal distention, anal bleeding, blood in stool, constipation, flatus, hematochezia, melena and rectal pain.   Genitourinary: Negative for decreased urine volume, difficulty urinating, dysuria, frequency, hematuria and urgency.   Musculoskeletal: Negative for arthralgias.   Neurological: Negative.  Negative for dizziness, syncope, speech difficulty, light-headedness and headaches.   Psychiatric/Behavioral: Negative for agitation, confusion and hallucinations. The patient is not nervous/anxious.        Objective:      Physical Exam   Constitutional: She is oriented to person, place, and time. She appears well-developed and well-nourished. No distress.   HENT:   Head: Normocephalic and atraumatic.   Mouth/Throat: No oropharyngeal exudate.   Eyes: Conjunctivae are normal. Right eye exhibits no discharge. Left eye exhibits no discharge.   Cardiovascular: Normal rate, regular rhythm and normal heart sounds.   No murmur heard.  Pulmonary/Chest: Effort normal and breath sounds normal. No respiratory distress. She has no wheezes.   Abdominal: Soft. Normal appearance and bowel sounds are normal. She exhibits no distension. There is no hepatosplenomegaly. There is tenderness in the right upper quadrant and epigastric area. There is no rigidity, no rebound, no guarding and no CVA tenderness.   Musculoskeletal: Normal range of motion. She exhibits no edema or tenderness.   Neurological: She is alert and oriented to person, place, and time.   Skin: Skin is warm and dry. No rash noted. She is not diaphoretic. No erythema.   Psychiatric: She has a normal mood and affect. Her behavior is normal. Judgment and thought content normal.   Nursing note and vitals reviewed.      Assessment:       1. Epigastric pain    2. Diarrhea,  unspecified type    3. Biliary colic    4. Anxiety        Plan:       Mirian was seen today for abdominal pain.    Diagnoses and all orders for this visit:    Epigastric pain  -     CBC auto differential; Future  -     Comprehensive metabolic panel; Future  -     Amylase; Future  -     Lipase; Future  -     US Abdomen Limited; Future    Diarrhea, unspecified type  -     CBC auto differential; Future  -     Comprehensive metabolic panel; Future  -     Amylase; Future  -     Lipase; Future  -     US Abdomen Limited; Future    Biliary colic  -     CBC auto differential; Future  -     Comprehensive metabolic panel; Future  -     Amylase; Future  -     Lipase; Future  -     US Abdomen Limited; Future    Anxiety    labs and ultrasound  Seems like gallbladder  If work up is Negative, consider treatment for anxiety/irritable bowel.

## 2019-10-08 ENCOUNTER — TELEPHONE (OUTPATIENT)
Dept: INTERNAL MEDICINE | Facility: CLINIC | Age: 24
End: 2019-10-08

## 2019-10-08 NOTE — TELEPHONE ENCOUNTER
HISTORY OF PRESENT ILLNESS  Kim Negrete is a 36 y.o. female. HPI Comments: Patient presents today for routine physical exam, f/u labs    No new concerns today aside from weight gain, which patient attributes to diet and lack of exercise. Patient used to do yoga which she has stopped. Reports R upper thigh pain with tingling down R upper leg. 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      Comment: pn prophylaxis acyclovir  No date: H/O seasonal allergies  No date: OCD (obsessive compulsive disorder)  No date: Small fiber neuropathy (HCC)  No date: Vitamin D deficiency     Current Outpatient Prescriptions:     chlorhexidine (PERIDEX) 0.12 % solution, RINSE WITH 1/2 OUNCES TWICE A DAY AFTER BRUSHING AND FLOSSING, Disp: , Rfl: 0    doxycycline (PERIOSTAT) 20 mg tablet, take 1 tablet by mouth twice a day until finished, Disp: , Rfl: 0    levonorgestrel (MIRENA) 20 mcg/24 hr (5 years) IUD, 1 Each by IntraUTERine route once., Disp: , Rfl:      Complete Physical   The history is provided by the patient. This is a new problem. Pertinent negatives include no chest pain, no abdominal pain, no headaches and no shortness of breath. Review of Systems   Constitutional: Negative for chills and fever. HENT: Negative for congestion and hearing loss. Eyes: Negative for blurred vision. Respiratory: Negative for cough and shortness of breath. Cardiovascular: Negative for chest pain, palpitations and leg swelling. Gastrointestinal: Negative for abdominal pain, constipation, diarrhea, heartburn, nausea and vomiting. Genitourinary: Negative for dysuria. Musculoskeletal: Positive for joint pain. Neurological: Positive for tingling. Negative for weakness and headaches. Endo/Heme/Allergies: Positive for environmental allergies. Psychiatric/Behavioral: The patient does not have insomnia.         Physical Exam   Constitutional: Spoke to pt. Assisted in scheduling labs. Voiced understanding.    She is oriented to person, place, and time. She appears well-developed and well-nourished. No distress. HENT:   Head: Normocephalic. Right Ear: External ear normal.   Left Ear: External ear normal.   Nose: Nose normal.   Mouth/Throat: Oropharynx is clear and moist.   Eyes: Conjunctivae and EOM are normal. Pupils are equal, round, and reactive to light. Neck: Normal range of motion. Neck supple. No thyromegaly present. Cardiovascular: Normal rate, regular rhythm, normal heart sounds and intact distal pulses. Pulmonary/Chest: Effort normal and breath sounds normal. No respiratory distress. She has no wheezes. Abdominal: Soft. Bowel sounds are normal. She exhibits no distension. There is no tenderness. Musculoskeletal: Normal range of motion. She exhibits no edema or tenderness. Lymphadenopathy:     She has no cervical adenopathy. Neurological: She is alert and oriented to person, place, and time. Skin: Skin is warm and dry. Psychiatric: She has a normal mood and affect. Her behavior is normal.     Visit Vitals    /75    Pulse 79    Temp 98.6 °F (37 °C) (Oral)    Resp 15    Ht 5' 7\" (1.702 m)    Wt 190 lb (86.2 kg)    SpO2 97%    BMI 29.76 kg/m2    Results for Jo MOHAMUD (MRN [id number]) as of 8/17/2017 08:29   Ref.  Range 8/14/2017 07:47   WBC Latest Ref Range: 4.6 - 13.2 K/uL 4.4 (L)   RBC Latest Ref Range: 4.20 - 5.30 M/uL 4.26   HGB Latest Ref Range: 12.0 - 16.0 g/dL 13.1   HCT Latest Ref Range: 35.0 - 45.0 % 40.6   MCV Latest Ref Range: 74.0 - 97.0 FL 95.3   MCH Latest Ref Range: 24.0 - 34.0 PG 30.8   MCHC Latest Ref Range: 31.0 - 37.0 g/dL 32.3   RDW Latest Ref Range: 11.6 - 14.5 % 13.3   PLATELET Latest Ref Range: 135 - 420 K/uL 404   MPV Latest Ref Range: 9.2 - 11.8 FL 9.0 (L)   NEUTROPHILS Latest Ref Range: 40 - 73 % 52   LYMPHOCYTES Latest Ref Range: 21 - 52 % 34   MONOCYTES Latest Ref Range: 3 - 10 % 7   EOSINOPHILS Latest Ref Range: 0 - 5 % 6 (H)   BASOPHILS Latest Ref Range: 0 - 2 % 1   DF Latest Units:   AUTOMATED   ABS. NEUTROPHILS Latest Ref Range: 1.8 - 8.0 K/UL 2.3   ABS. LYMPHOCYTES Latest Ref Range: 0.9 - 3.6 K/UL 1.5   ABS. MONOCYTES Latest Ref Range: 0.05 - 1.2 K/UL 0.3   ABS. EOSINOPHILS Latest Ref Range: 0.0 - 0.4 K/UL 0.3   ABS. BASOPHILS Latest Ref Range: 0.0 - 0.06 K/UL 0.0   Sodium Latest Ref Range: 136 - 145 mmol/L 139   Potassium Latest Ref Range: 3.5 - 5.5 mmol/L 4.8   Chloride Latest Ref Range: 100 - 108 mmol/L 106   CO2 Latest Ref Range: 21 - 32 mmol/L 24   Anion gap Latest Ref Range: 3.0 - 18 mmol/L 9   Glucose Latest Ref Range: 74 - 99 mg/dL 80   BUN Latest Ref Range: 7.0 - 18 MG/DL 8   Creatinine Latest Ref Range: 0.6 - 1.3 MG/DL 0.60   BUN/Creatinine ratio Latest Ref Range: 12 - 20   13   Calcium Latest Ref Range: 8.5 - 10.1 MG/DL 8.7   GFR est non-AA Latest Ref Range: >60 ml/min/1.73m2 >60   GFR est AA Latest Ref Range: >60 ml/min/1.73m2 >60   Bilirubin, total Latest Ref Range: 0.2 - 1.0 MG/DL 0.5   Protein, total Latest Ref Range: 6.4 - 8.2 g/dL 6.8   Albumin Latest Ref Range: 3.4 - 5.0 g/dL 3.8   Globulin Latest Ref Range: 2.0 - 4.0 g/dL 3.0   A-G Ratio Latest Ref Range: 0.8 - 1.7   1.3   ALT (SGPT) Latest Ref Range: 13 - 56 U/L 17   AST Latest Ref Range: 15 - 37 U/L 11 (L)   Alk. phosphatase Latest Ref Range: 45 - 117 U/L 62   Triglyceride Latest Ref Range: <150 MG/DL 49   Cholesterol, total Latest Ref Range: <200 MG/   HDL Cholesterol Latest Ref Range: 40 - 60 MG/DL 89 (H)   CHOL/HDL Ratio Latest Ref Range: 0 - 5.0   2.0   LDL, calculated Latest Ref Range: 0 - 100 MG/DL 82.2   VLDL, calculated Latest Units: MG/DL 9.8   TSH Latest Ref Range: 0.36 - 3.74 uIU/mL 1.39   VITAMIN D, 25 HYDROXY Unknown Rpt (A)     ASSESSMENT and PLAN    ICD-10-CM ICD-9-CM    1. Routine physical examination Z00.00 V70.0    2.  Vitamin D deficiency E55.9 268.9 VITAMIN D, 25 HYDROXY (future)  Vitamd D3 6000u/day or 06955d/wk for 8 wks then 1500u/day     Reviewed plan with patient including diagnoses, treatment and follow up. Provided AVS with education on above diagnoses. No further questions/concerns at this time. Pt to follow up as scheduled or sooner if symptoms worsen/fail to improve.

## 2019-10-08 NOTE — TELEPHONE ENCOUNTER
----- Message from Supriya Elizalde sent at 10/8/2019  3:18 PM CDT -----  Contact: PT  .Type:  Patient Returning Call    Who Called: PT  Who Left Message for Patient:   Does the patient know what this is regarding?: TEST RESULTS   Would the patient rather a call back or a response via [facility]?  CALL BACK   Best Call Back Number: [phone]  Additional Information:

## 2019-10-08 NOTE — TELEPHONE ENCOUNTER
Left voicemail for patient regarding her lab work from 10/7/2019.  We were unable to process her labs.  They will need to be redrawn.  I left a detailed message for her, labs were put back in.  Please have patient rescheduled.

## 2019-10-09 ENCOUNTER — LAB VISIT (OUTPATIENT)
Dept: LAB | Facility: HOSPITAL | Age: 24
End: 2019-10-09
Attending: NURSE PRACTITIONER
Payer: COMMERCIAL

## 2019-10-09 DIAGNOSIS — R10.13 EPIGASTRIC PAIN: ICD-10-CM

## 2019-10-09 DIAGNOSIS — R19.7 DIARRHEA, UNSPECIFIED TYPE: ICD-10-CM

## 2019-10-09 DIAGNOSIS — K80.50 BILIARY COLIC: ICD-10-CM

## 2019-10-09 LAB
BASOPHILS # BLD AUTO: 0.04 K/UL (ref 0–0.2)
BASOPHILS NFR BLD: 0.6 % (ref 0–1.9)
DIFFERENTIAL METHOD: ABNORMAL
EOSINOPHIL # BLD AUTO: 0.2 K/UL (ref 0–0.5)
EOSINOPHIL NFR BLD: 2.5 % (ref 0–8)
ERYTHROCYTE [DISTWIDTH] IN BLOOD BY AUTOMATED COUNT: 14 % (ref 11.5–14.5)
HCT VFR BLD AUTO: 39.4 % (ref 37–48.5)
HGB BLD-MCNC: 12.9 G/DL (ref 12–16)
IMM GRANULOCYTES # BLD AUTO: 0.01 K/UL (ref 0–0.04)
IMM GRANULOCYTES NFR BLD AUTO: 0.2 % (ref 0–0.5)
LYMPHOCYTES # BLD AUTO: 2.1 K/UL (ref 1–4.8)
LYMPHOCYTES NFR BLD: 32.1 % (ref 18–48)
MCH RBC QN AUTO: 26.9 PG (ref 27–31)
MCHC RBC AUTO-ENTMCNC: 32.7 G/DL (ref 32–36)
MCV RBC AUTO: 82 FL (ref 82–98)
MONOCYTES # BLD AUTO: 0.5 K/UL (ref 0.3–1)
MONOCYTES NFR BLD: 7 % (ref 4–15)
NEUTROPHILS # BLD AUTO: 3.7 K/UL (ref 1.8–7.7)
NEUTROPHILS NFR BLD: 57.6 % (ref 38–73)
NRBC BLD-RTO: 0 /100 WBC
PLATELET # BLD AUTO: 379 K/UL (ref 150–350)
PMV BLD AUTO: 9.7 FL (ref 9.2–12.9)
RBC # BLD AUTO: 4.79 M/UL (ref 4–5.4)
WBC # BLD AUTO: 6.44 K/UL (ref 3.9–12.7)

## 2019-10-09 PROCEDURE — 82150 ASSAY OF AMYLASE: CPT

## 2019-10-09 PROCEDURE — 80053 COMPREHEN METABOLIC PANEL: CPT

## 2019-10-09 PROCEDURE — 85025 COMPLETE CBC W/AUTO DIFF WBC: CPT

## 2019-10-09 PROCEDURE — 36415 COLL VENOUS BLD VENIPUNCTURE: CPT | Mod: PO

## 2019-10-09 PROCEDURE — 83690 ASSAY OF LIPASE: CPT

## 2019-10-10 ENCOUNTER — TELEPHONE (OUTPATIENT)
Dept: RADIOLOGY | Facility: HOSPITAL | Age: 24
End: 2019-10-10

## 2019-10-10 LAB
ALBUMIN SERPL BCP-MCNC: 4.2 G/DL (ref 3.5–5.2)
ALP SERPL-CCNC: 55 U/L (ref 55–135)
ALT SERPL W/O P-5'-P-CCNC: 26 U/L (ref 10–44)
AMYLASE SERPL-CCNC: 36 U/L (ref 20–110)
ANION GAP SERPL CALC-SCNC: 10 MMOL/L (ref 8–16)
AST SERPL-CCNC: 26 U/L (ref 10–40)
BILIRUB SERPL-MCNC: 0.3 MG/DL (ref 0.1–1)
BUN SERPL-MCNC: 8 MG/DL (ref 6–20)
CALCIUM SERPL-MCNC: 9.3 MG/DL (ref 8.7–10.5)
CHLORIDE SERPL-SCNC: 106 MMOL/L (ref 95–110)
CO2 SERPL-SCNC: 22 MMOL/L (ref 23–29)
CREAT SERPL-MCNC: 0.8 MG/DL (ref 0.5–1.4)
EST. GFR  (AFRICAN AMERICAN): >60 ML/MIN/1.73 M^2
EST. GFR  (NON AFRICAN AMERICAN): >60 ML/MIN/1.73 M^2
GLUCOSE SERPL-MCNC: 96 MG/DL (ref 70–110)
LIPASE SERPL-CCNC: 18 U/L (ref 4–60)
POTASSIUM SERPL-SCNC: 4.3 MMOL/L (ref 3.5–5.1)
PROT SERPL-MCNC: 7.6 G/DL (ref 6–8.4)
SODIUM SERPL-SCNC: 138 MMOL/L (ref 136–145)

## 2019-10-11 ENCOUNTER — HOSPITAL ENCOUNTER (OUTPATIENT)
Dept: RADIOLOGY | Facility: HOSPITAL | Age: 24
Discharge: HOME | End: 2019-10-11
Attending: NURSE PRACTITIONER
Payer: COMMERCIAL

## 2019-10-11 DIAGNOSIS — R19.7 DIARRHEA, UNSPECIFIED TYPE: ICD-10-CM

## 2019-10-11 DIAGNOSIS — R10.13 EPIGASTRIC PAIN: ICD-10-CM

## 2019-10-11 DIAGNOSIS — K80.50 BILIARY COLIC: ICD-10-CM

## 2019-10-11 PROCEDURE — 76705 ECHO EXAM OF ABDOMEN: CPT | Mod: TC

## 2019-10-11 PROCEDURE — 76705 ECHO EXAM OF ABDOMEN: CPT | Mod: 26,,, | Performed by: RADIOLOGY

## 2019-10-11 PROCEDURE — 76705 US ABDOMEN LIMITED: ICD-10-PCS | Mod: 26,,, | Performed by: RADIOLOGY

## 2019-10-17 ENCOUNTER — PATIENT MESSAGE (OUTPATIENT)
Dept: INTERNAL MEDICINE | Facility: CLINIC | Age: 24
End: 2019-10-17

## 2019-11-14 ENCOUNTER — PATIENT MESSAGE (OUTPATIENT)
Dept: INTERNAL MEDICINE | Facility: CLINIC | Age: 24
End: 2019-11-14

## 2020-01-13 ENCOUNTER — TELEPHONE (OUTPATIENT)
Dept: INTERNAL MEDICINE | Facility: CLINIC | Age: 25
End: 2020-01-13

## 2020-01-14 ENCOUNTER — OFFICE VISIT (OUTPATIENT)
Dept: INTERNAL MEDICINE | Facility: CLINIC | Age: 25
End: 2020-01-14
Payer: COMMERCIAL

## 2020-01-14 VITALS
OXYGEN SATURATION: 99 % | WEIGHT: 249.56 LBS | TEMPERATURE: 98 F | SYSTOLIC BLOOD PRESSURE: 110 MMHG | BODY MASS INDEX: 40.11 KG/M2 | HEART RATE: 64 BPM | DIASTOLIC BLOOD PRESSURE: 80 MMHG | HEIGHT: 66 IN

## 2020-01-14 DIAGNOSIS — R09.A2 GLOBUS SENSATION: ICD-10-CM

## 2020-01-14 DIAGNOSIS — J30.9 ALLERGIC RHINITIS, UNSPECIFIED SEASONALITY, UNSPECIFIED TRIGGER: Primary | ICD-10-CM

## 2020-01-14 PROCEDURE — 99214 PR OFFICE/OUTPT VISIT, EST, LEVL IV, 30-39 MIN: ICD-10-PCS | Mod: S$GLB,,, | Performed by: PHYSICIAN ASSISTANT

## 2020-01-14 PROCEDURE — 99999 PR PBB SHADOW E&M-EST. PATIENT-LVL IV: CPT | Mod: PBBFAC,,, | Performed by: PHYSICIAN ASSISTANT

## 2020-01-14 PROCEDURE — 99999 PR PBB SHADOW E&M-EST. PATIENT-LVL IV: ICD-10-PCS | Mod: PBBFAC,,, | Performed by: PHYSICIAN ASSISTANT

## 2020-01-14 PROCEDURE — 99214 OFFICE O/P EST MOD 30 MIN: CPT | Mod: S$GLB,,, | Performed by: PHYSICIAN ASSISTANT

## 2020-01-14 PROCEDURE — 3008F PR BODY MASS INDEX (BMI) DOCUMENTED: ICD-10-PCS | Mod: CPTII,S$GLB,, | Performed by: PHYSICIAN ASSISTANT

## 2020-01-14 PROCEDURE — 3008F BODY MASS INDEX DOCD: CPT | Mod: CPTII,S$GLB,, | Performed by: PHYSICIAN ASSISTANT

## 2020-01-14 RX ORDER — FLUTICASONE PROPIONATE 50 MCG
2 SPRAY, SUSPENSION (ML) NASAL DAILY
Qty: 1 BOTTLE | Refills: 0 | Status: SHIPPED | OUTPATIENT
Start: 2020-01-14 | End: 2020-02-13

## 2020-01-14 NOTE — PATIENT INSTRUCTIONS

## 2020-01-14 NOTE — PROGRESS NOTES
Subjective:      Patient ID: Mirian Carbajal is a 24 y.o. female.    Chief Complaint: Mass (throat on and off for a month)          Sore Throat    This is a new problem. The current episode started in the past 7 days. The problem has been waxing and waning. There has been no fever. Associated symptoms include ear pain, a plugged ear sensation and trouble swallowing. Pertinent negatives include no abdominal pain, congestion, coughing, diarrhea, drooling, ear discharge, headaches, hoarse voice, neck pain, shortness of breath, stridor, swollen glands or vomiting. She has had no exposure to strep or mono. She has tried nothing for the symptoms. The treatment provided no relief.   Admits to having allergies. Takes benadryl daily. Has not been on flonase recently. Failed treatment with zyrtec and claritin.     Review of Systems   Constitutional: Negative for activity change, appetite change, chills, diaphoresis, fatigue, fever and unexpected weight change.   HENT: Positive for ear pain, postnasal drip, rhinorrhea, sore throat and trouble swallowing. Negative for congestion, dental problem, drooling, ear discharge, facial swelling, hearing loss, hoarse voice, mouth sores, nosebleeds, sinus pressure, sinus pain, sneezing, tinnitus and voice change.    Eyes: Negative.  Negative for visual disturbance.   Respiratory: Negative.  Negative for cough, choking, chest tightness, shortness of breath and stridor.    Cardiovascular: Negative for chest pain, palpitations and leg swelling.   Gastrointestinal: Negative for abdominal distention, abdominal pain, blood in stool, constipation, diarrhea, nausea and vomiting.   Endocrine: Negative for cold intolerance, heat intolerance, polydipsia and polyuria.   Genitourinary: Negative.  Negative for difficulty urinating and frequency.   Musculoskeletal: Negative for arthralgias, back pain, gait problem, joint swelling, myalgias and neck pain.   Skin: Negative for color change,  "pallor, rash and wound.   Neurological: Negative for dizziness, tremors, weakness, light-headedness, numbness and headaches.   Hematological: Negative for adenopathy.   Psychiatric/Behavioral: Negative for behavioral problems, confusion, self-injury, sleep disturbance and suicidal ideas. The patient is not nervous/anxious.      Objective:   /80 (BP Location: Right arm, Patient Position: Sitting, BP Method: Medium (Manual))   Pulse 64   Temp 97.5 °F (36.4 °C) (Tympanic)   Ht 5' 6" (1.676 m)   Wt 113.2 kg (249 lb 9 oz)   SpO2 99%   BMI 40.28 kg/m²     Physical Exam   Constitutional: She is oriented to person, place, and time. She appears well-developed and well-nourished. No distress.   HENT:   Head: Normocephalic and atraumatic.   Right Ear: External ear normal.   Left Ear: External ear normal.   Nose: Mucosal edema and rhinorrhea present.   Mouth/Throat: Uvula is midline and mucous membranes are normal. No oral lesions. No uvula swelling. Oropharyngeal exudate present. No tonsillar exudate.   Eyes: Pupils are equal, round, and reactive to light. Conjunctivae and EOM are normal.   Neck: Trachea normal and normal range of motion. Neck supple. No thyroid mass and no thyromegaly present.   Cardiovascular: Normal rate, regular rhythm and normal heart sounds. Exam reveals no gallop and no friction rub.   No murmur heard.  Pulmonary/Chest: Effort normal and breath sounds normal. No respiratory distress. She has no wheezes. She has no rales. She exhibits no tenderness.   Abdominal: Soft. She exhibits no distension. There is no tenderness.   Musculoskeletal: Normal range of motion.   Lymphadenopathy:     She has no cervical adenopathy.   Neurological: She is alert and oriented to person, place, and time.   Skin: Skin is warm and dry. She is not diaphoretic.   Psychiatric: She has a normal mood and affect. Her behavior is normal. Judgment and thought content normal.   Vitals reviewed.      Assessment:     1. " Allergic rhinitis, unspecified seasonality, unspecified trigger    2. Globus sensation      Plan:   Allergic rhinitis, unspecified seasonality, unspecified trigger  -     fluticasone propionate (FLONASE) 50 mcg/actuation nasal spray; 2 sprays (100 mcg total) by Each Nostril route once daily.  Dispense: 1 Bottle; Refill: 0    Globus sensation  -     Ambulatory referral to ENT    -diagnosis and treatment options discussed with her today. Discussed possible causes of the globus sensation (reflux and postnasal drip). Will try flonase and refer to ENT for possible scope.     Follow up if symptoms worsen or fail to improve.

## 2020-04-04 ENCOUNTER — PATIENT MESSAGE (OUTPATIENT)
Dept: INTERNAL MEDICINE | Facility: CLINIC | Age: 25
End: 2020-04-04

## 2020-05-27 ENCOUNTER — LAB VISIT (OUTPATIENT)
Dept: LAB | Facility: HOSPITAL | Age: 25
End: 2020-05-27
Attending: FAMILY MEDICINE
Payer: COMMERCIAL

## 2020-05-27 ENCOUNTER — OFFICE VISIT (OUTPATIENT)
Dept: INTERNAL MEDICINE | Facility: CLINIC | Age: 25
End: 2020-05-27
Payer: COMMERCIAL

## 2020-05-27 VITALS — DIASTOLIC BLOOD PRESSURE: 76 MMHG | SYSTOLIC BLOOD PRESSURE: 124 MMHG

## 2020-05-27 DIAGNOSIS — E28.2 PCOS (POLYCYSTIC OVARIAN SYNDROME): ICD-10-CM

## 2020-05-27 DIAGNOSIS — R51.9 ACUTE INTRACTABLE HEADACHE, UNSPECIFIED HEADACHE TYPE: ICD-10-CM

## 2020-05-27 DIAGNOSIS — D57.3 SICKLE CELL TRAIT: ICD-10-CM

## 2020-05-27 DIAGNOSIS — R51.9 ACUTE INTRACTABLE HEADACHE, UNSPECIFIED HEADACHE TYPE: Primary | ICD-10-CM

## 2020-05-27 LAB
ALBUMIN SERPL BCP-MCNC: 3.7 G/DL (ref 3.5–5.2)
ALP SERPL-CCNC: 50 U/L (ref 55–135)
ALT SERPL W/O P-5'-P-CCNC: 21 U/L (ref 10–44)
ANION GAP SERPL CALC-SCNC: 7 MMOL/L (ref 8–16)
AST SERPL-CCNC: 19 U/L (ref 10–40)
BASOPHILS # BLD AUTO: 0.05 K/UL (ref 0–0.2)
BASOPHILS NFR BLD: 0.7 % (ref 0–1.9)
BILIRUB SERPL-MCNC: 0.2 MG/DL (ref 0.1–1)
BUN SERPL-MCNC: 9 MG/DL (ref 6–20)
CALCIUM SERPL-MCNC: 9.1 MG/DL (ref 8.7–10.5)
CHLORIDE SERPL-SCNC: 108 MMOL/L (ref 95–110)
CO2 SERPL-SCNC: 25 MMOL/L (ref 23–29)
CREAT SERPL-MCNC: 0.8 MG/DL (ref 0.5–1.4)
DIFFERENTIAL METHOD: ABNORMAL
EOSINOPHIL # BLD AUTO: 0.2 K/UL (ref 0–0.5)
EOSINOPHIL NFR BLD: 2.2 % (ref 0–8)
ERYTHROCYTE [DISTWIDTH] IN BLOOD BY AUTOMATED COUNT: 14.2 % (ref 11.5–14.5)
EST. GFR  (AFRICAN AMERICAN): >60 ML/MIN/1.73 M^2
EST. GFR  (NON AFRICAN AMERICAN): >60 ML/MIN/1.73 M^2
GLUCOSE SERPL-MCNC: 82 MG/DL (ref 70–110)
HCT VFR BLD AUTO: 41.3 % (ref 37–48.5)
HGB BLD-MCNC: 12.5 G/DL (ref 12–16)
IMM GRANULOCYTES # BLD AUTO: 0.02 K/UL (ref 0–0.04)
IMM GRANULOCYTES NFR BLD AUTO: 0.3 % (ref 0–0.5)
LYMPHOCYTES # BLD AUTO: 2.2 K/UL (ref 1–4.8)
LYMPHOCYTES NFR BLD: 30.5 % (ref 18–48)
MCH RBC QN AUTO: 26.8 PG (ref 27–31)
MCHC RBC AUTO-ENTMCNC: 30.3 G/DL (ref 32–36)
MCV RBC AUTO: 88 FL (ref 82–98)
MONOCYTES # BLD AUTO: 0.5 K/UL (ref 0.3–1)
MONOCYTES NFR BLD: 7.1 % (ref 4–15)
NEUTROPHILS # BLD AUTO: 4.2 K/UL (ref 1.8–7.7)
NEUTROPHILS NFR BLD: 59.2 % (ref 38–73)
NRBC BLD-RTO: 0 /100 WBC
PLATELET # BLD AUTO: 360 K/UL (ref 150–350)
PMV BLD AUTO: 9.7 FL (ref 9.2–12.9)
POTASSIUM SERPL-SCNC: 4.2 MMOL/L (ref 3.5–5.1)
PROT SERPL-MCNC: 7.3 G/DL (ref 6–8.4)
RBC # BLD AUTO: 4.67 M/UL (ref 4–5.4)
SODIUM SERPL-SCNC: 140 MMOL/L (ref 136–145)
TSH SERPL DL<=0.005 MIU/L-ACNC: 1.96 UIU/ML (ref 0.4–4)
WBC # BLD AUTO: 7.17 K/UL (ref 3.9–12.7)

## 2020-05-27 PROCEDURE — 85025 COMPLETE CBC W/AUTO DIFF WBC: CPT

## 2020-05-27 PROCEDURE — 96372 THER/PROPH/DIAG INJ SC/IM: CPT | Mod: ,,, | Performed by: FAMILY MEDICINE

## 2020-05-27 PROCEDURE — 84443 ASSAY THYROID STIM HORMONE: CPT

## 2020-05-27 PROCEDURE — 36415 COLL VENOUS BLD VENIPUNCTURE: CPT | Mod: PO

## 2020-05-27 PROCEDURE — 96372 PR INJECTION,THERAP/PROPH/DIAG2ST, IM OR SUBCUT: ICD-10-PCS | Mod: ,,, | Performed by: FAMILY MEDICINE

## 2020-05-27 PROCEDURE — 80053 COMPREHEN METABOLIC PANEL: CPT

## 2020-05-27 RX ORDER — RIZATRIPTAN BENZOATE 10 MG/1
10 TABLET ORAL
Qty: 10 TABLET | Refills: 1 | Status: SHIPPED | OUTPATIENT
Start: 2020-05-27 | End: 2020-11-17

## 2020-05-27 RX ORDER — KETOROLAC TROMETHAMINE 30 MG/ML
60 INJECTION, SOLUTION INTRAMUSCULAR; INTRAVENOUS
Status: COMPLETED | OUTPATIENT
Start: 2020-05-27 | End: 2020-05-27

## 2020-05-27 RX ORDER — PROMETHAZINE HYDROCHLORIDE 25 MG/1
25 TABLET ORAL EVERY 6 HOURS PRN
Qty: 30 TABLET | Refills: 0 | Status: SHIPPED | OUTPATIENT
Start: 2020-05-27 | End: 2020-06-26

## 2020-05-27 RX ADMIN — KETOROLAC TROMETHAMINE 60 MG: 30 INJECTION, SOLUTION INTRAMUSCULAR; INTRAVENOUS at 09:05

## 2020-05-27 NOTE — PROGRESS NOTES
Subjective:      Patient ID: Mirian Carbajal is a 24 y.o. female.    Chief Complaint: Headache    Disclaimer:  This note is prepared using voice recognition software and as such is likely to have errors and has not been proof read. Please contact me for questions.     The patient location is:home  The chief complaint leading to consultation is: followup / my chart request/ severe ha  Visit type: Virtual visit with synchronous audio and video  Total time spent with patient:720am-736am- pt then advised to come in for vitals check and in person exam, so visit converted to in person visit.   Each patient to whom he or she provides medical services by telemedicine is:  (1) informed of the relationship between the physician and patient and the respective role of any other health care provider with respect to management of the patient; and (2) notified that he or she may decline to receive medical services by telemedicine and may withdraw from such care at any time.    Notes:   Mirian Carbajal is a 23 y.o. female who presents severe headaches. Had for a very long time, went to hospital [facility] a few years ago because they were so bad and got ED and got a steroid shot. Did a MRI in the past and nothing acute. Went to neurologist in the past.  Had used bubital in the past and didn't help her.  For a long time took execedrin tensino HA meds and tears her stomach up.  More  With stress and more moving around and will start to hurt. More on the temple and side of the head. Not in the neck or shoulders where it has been before. If sits doewn it isn't as a bad.  Carndall started to go away with going to sleep and for 4 days was lingering and stayed in warm spot. Didn't matter if she used warm compression or ibuprofen.  Hasn't been been able to check BP.     No new vitamins or supplements. Used to take fish oil but none recently in 1 yr.  Got glasses last year around Dec 2019.     If gets really bad and at work and can't lay  down then will get nauseated. Must lay down to help the headache. Goes in dark room. + sound and light sensitivity. Getting a sensory overload yesterday. At [employer]. .    Believes she has a hx of migraines initially. Got to [place] in 2014. 2015 and 2016 due to ha.     Has had regular menses each month but shorter now. Last 4-5 months lasting only 3-5 days.    Reports immitrex didn't work nor the fiorcet.      Patient came into the office.  Blood pressure was checked was 124/76.  She reports she feels like she has had a lot of hormonal issues as well.  Has met with Dr. Katz also.  Reports some facial hair growth.  However menstrual cycle is more normal now.  She does have PCOS.  She would be high risk for pseudotumor cerebri.  Does not have scalp tenderness today.  No evidence of sinus infection at this time either.  Does have a history of sickle cell trait.  Is doing blood work today.  Reports that she feels like she does need to eat something at this time currently fasting.          Lab Results   Component Value Date    WBC 6.44 10/09/2019    HGB 12.9 10/09/2019    HCT 39.4 10/09/2019     (H) 10/09/2019    CHOL 190 12/06/2018    TRIG 217 (H) 12/06/2018    HDL 38 (L) 12/06/2018    ALT 26 10/09/2019    AST 26 10/09/2019     10/09/2019    K 4.3 10/09/2019     10/09/2019    CREATININE 0.8 10/09/2019    BUN 8 10/09/2019    CO2 22 (L) 10/09/2019    TSH 1.953 12/06/2018    HGBA1C 5.6 12/06/2018       US Abdomen Limited  Narrative: EXAMINATION:  US ABDOMEN LIMITED    CLINICAL HISTORY:  Epigastric pain    TECHNIQUE:  Limited ultrasound of the right upper quadrant of the abdomen (including pancreas, liver, gallbladder, common bile duct, and spleen) was performed.    COMPARISON:  None.    FINDINGS:  Pancreas normal in appearance.    Gallbladder well distended with no evidence cholelithiasis or cholecystitis.  Wall thickness normal at 1.4 mm.  Negative ultrasonic Gonzalez  sign.    No intra or extrahepatic biliary dilatation.  Common hepatic duct 5.8 mm.    Liver is borderline enlarged measuring 17.0 cm craniocaudal length.  Uniform normal echotexture throughout with no cystic or solid lesion.    Right kidney normal in appearance measuring 12.2 cm in length.  Cortex well maintained and smoothly marginated bilaterally.  No hydronephrosis or nephrolithiasis.  Impression: Borderline hepatomegaly.  Otherwise unremarkable appearance of the liver.    No other significant findings.    Electronically signed by: Breezy Jacques MD  Date:    10/11/2019  Time:    13:24        Review of Systems   Constitutional: Positive for appetite change. Negative for activity change, fatigue and unexpected weight change.   HENT: Negative for hearing loss, rhinorrhea and trouble swallowing.    Eyes: Positive for photophobia. Negative for pain, discharge and visual disturbance.   Respiratory: Negative for chest tightness and wheezing.    Cardiovascular: Negative for chest pain and palpitations.   Gastrointestinal: Negative for blood in stool, constipation, diarrhea and vomiting.   Endocrine: Negative for polydipsia and polyuria.   Genitourinary: Negative for difficulty urinating, dysuria, hematuria and menstrual problem.   Musculoskeletal: Positive for neck pain. Negative for arthralgias and joint swelling.   Neurological: Positive for headaches. Negative for weakness.   Psychiatric/Behavioral: Positive for dysphoric mood. Negative for confusion.     Objective:     Vitals:    05/27/20 0906   BP: 124/76     Physical Exam   Constitutional: She appears well-developed and well-nourished. She is active and cooperative. She does not have a sickly appearance. She does not appear ill. No distress.   Mo aaf   HENT:   Head: Normocephalic and atraumatic.   Right Ear: Tympanic membrane and external ear normal.   Left Ear: Tympanic membrane and external ear normal.   Mouth/Throat: Oropharynx is clear and moist. No  oropharyngeal exudate.   Eyes: Pupils are equal, round, and reactive to light. Conjunctivae and EOM are normal.   Neck: Normal range of motion. Neck supple.   Cardiovascular: Normal rate, regular rhythm and normal heart sounds.   Pulmonary/Chest: Effort normal and breath sounds normal.   Neurological: She is alert. No cranial nerve deficit. Coordination normal.   Psychiatric: She has a normal mood and affect. Her behavior is normal. Judgment and thought content normal. Her mood appears not anxious. Her affect is not angry, not blunt, not labile and not inappropriate. Her speech is not rapid and/or pressured, not delayed, not tangential and not slurred. She is not agitated, not aggressive, not hyperactive, not slowed, not withdrawn, not actively hallucinating and not combative. Thought content is not paranoid and not delusional. Cognition and memory are normal. Cognition and memory are not impaired. She does not express impulsivity or inappropriate judgment. She does not exhibit a depressed mood. She expresses no homicidal and no suicidal ideation. She expresses no suicidal plans and no homicidal plans. She is communicative. She exhibits normal recent memory and normal remote memory. She is attentive.   Nursing note and vitals reviewed.    Assessment:     1. Acute intractable headache, unspecified headache type    2. Sickle cell trait    3. PCOS (polycystic ovarian syndrome)      Plan:   Mirian was seen today for headache.    Diagnoses and all orders for this visit:    Acute intractable headache, unspecified headache type  Comments:  BP is ok. will tx with toradol 60mg now, and send home to rest. given rx for phenergan and maxalt. if not improving then consider pseudotumor cerebri.  No provided today for the patient to work from home to rest.  Follow-up based on blood results as well.  Orders:  -     CBC auto differential; Future  -     Comprehensive metabolic panel; Future  -     TSH; Future    Sickle cell  trait-noted evaluate for anemia  -     CBC auto differential; Future  -     Comprehensive metabolic panel; Future  -     TSH; Future    PCOS (polycystic ovarian syndrome)-not currently on hormone therapy but high risk for pseudotumor cerebri.    Other orders  -     ketorolac injection 60 mg  -     promethazine (PHENERGAN) 25 MG tablet; Take 1 tablet (25 mg total) by mouth every 6 (six) hours as needed for Nausea (migraines).  -     rizatriptan (MAXALT) 10 MG tablet; Take 1 tablet (10 mg total) by mouth as needed for Migraine (max dose 2 tablets in 24 hrs).            Follow up if symptoms worsen or fail to improve.    There are no Patient Instructions on file for this visit.

## 2020-05-27 NOTE — PROGRESS NOTES
After obtaining consent, and per orders of Dr. Zoe Alcantar, Toradol 60 mg injection given by Uday Ayala LPN. Patient instructed to remain in clinic for 20 minutes afterwards, and to report any adverse reaction to me immediately.    Uday Ayala LPN

## 2020-05-27 NOTE — LETTER
May 27, 2020    Mirian Carbajal  [id number] [facility] [address]         [facility]  [address] [facility] [address]  Phone: [phone]  Fax: [phone] May 27, 2020     Patient: Mirian Carbajal   YOB: 1995   Date of Visit: 5/27/2020       To Whom It May Concern:    It is my medical opinion that Mirian Carbajal may return to work on 5/28/2020.    If you have any questions or concerns, please don't hesitate to call.    Sincerely,    Zoe Alcantar MD

## 2020-05-28 ENCOUNTER — PATIENT MESSAGE (OUTPATIENT)
Dept: INTERNAL MEDICINE | Facility: CLINIC | Age: 25
End: 2020-05-28

## 2020-05-28 DIAGNOSIS — R51.9 ACUTE INTRACTABLE HEADACHE, UNSPECIFIED HEADACHE TYPE: Primary | ICD-10-CM

## 2020-06-01 NOTE — TELEPHONE ENCOUNTER
The neurologists for [facility] (whether in [place] or [facility]) should be within her network as all within same group as [facility].

## 2020-06-12 ENCOUNTER — PATIENT OUTREACH (OUTPATIENT)
Dept: ADMINISTRATIVE | Facility: OTHER | Age: 25
End: 2020-06-12

## 2020-06-12 NOTE — PROGRESS NOTES
Patient's chart was reviewed.   Requested updates within Care Everywhere.  Immunizations reconciled.    Health Maintenance was updated.  e

## 2020-07-09 ENCOUNTER — PATIENT OUTREACH (OUTPATIENT)
Dept: ADMINISTRATIVE | Facility: OTHER | Age: 25
End: 2020-07-09

## 2020-07-10 NOTE — PROGRESS NOTES
Requested updates within Care Everywhere.  Patient's chart was reviewed for overdue MT topics.  Immunizations reconciled.

## 2020-07-20 ENCOUNTER — PATIENT OUTREACH (OUTPATIENT)
Dept: ADMINISTRATIVE | Facility: OTHER | Age: 25
End: 2020-07-20

## 2020-10-30 ENCOUNTER — PATIENT OUTREACH (OUTPATIENT)
Dept: ADMINISTRATIVE | Facility: OTHER | Age: 25
End: 2020-10-30

## 2020-11-09 ENCOUNTER — OFFICE VISIT (OUTPATIENT)
Dept: PRIMARY CARE CLINIC | Facility: CLINIC | Age: 25
End: 2020-11-09
Payer: COMMERCIAL

## 2020-11-09 DIAGNOSIS — K11.20 PAROTIDITIS: Primary | ICD-10-CM

## 2020-11-09 PROCEDURE — 99214 PR OFFICE/OUTPT VISIT, EST, LEVL IV, 30-39 MIN: ICD-10-PCS | Mod: 95,,, | Performed by: FAMILY MEDICINE

## 2020-11-09 PROCEDURE — 99214 OFFICE O/P EST MOD 30 MIN: CPT | Mod: 95,,, | Performed by: FAMILY MEDICINE

## 2020-11-09 RX ORDER — AMOXICILLIN AND CLAVULANATE POTASSIUM 875; 125 MG/1; MG/1
1 TABLET, FILM COATED ORAL EVERY 12 HOURS
Qty: 20 TABLET | Refills: 0 | Status: SHIPPED | OUTPATIENT
Start: 2020-11-09

## 2020-11-09 RX ORDER — PREDNISONE 20 MG/1
40 TABLET ORAL DAILY
Qty: 6 TABLET | Refills: 0 | Status: SHIPPED | OUTPATIENT
Start: 2020-11-09 | End: 2020-11-12

## 2020-11-09 NOTE — PATIENT INSTRUCTIONS
Understanding Parotid Duct Obstruction    Parotid duct obstruction is when part of your parotid duct becomes blocked. The parotid duct is a small tube that leads from a gland that makes saliva. The duct sends the saliva into your mouth. When its blocked, saliva cant flow normally.  How the saliva gland system works  The parotid gland is 1 of the salivary glands. The glands make saliva, the watery substance used to moisten your mouth and start the digestion process. The parotid gland sits at the back of your lower jaw. From there, saliva travels through a tube called the parotid duct. The saliva drains from the end of the duct and into your mouth.  Your salivary glands start to make saliva when youre eating. If the parotid duct is blocked, the saliva can start to back up into the parotid gland. This can cause pain and swelling. In some cases the gland and duct can become infected.  What causes parotid duct obstruction?  Parotid duct obstruction is most often caused by salivary gland stones. These are tiny stones made of calcium and other minerals. Youre more likely to have salivary gland stones if you:  · Have an infection in the parotid gland  · Dont have enough water in your body (dehydration)  · Have been taking water pills (diuretic medicine)  · Have taken anticholinergic medicine, such as atropine  · Have had injury to the duct  · Have gout  · Have smoked or still smoke  Parotid duct obstruction can also be caused by:  · Scar tissue  · Mucous plugs  · Abnormal growth of cells  Symptoms of parotid duct obstruction  Symptoms can include pain and swelling near the back of the jaw. Some people have only swelling or only pain. Symptoms may often come and go. They are often worse during eating, when the salivary gland makes more saliva.  Sometimes an object only blocks the parotid duct off and on. If this is the case, you may go without symptoms for days or weeks.  If your gland stays blocked for a long time,  it may stop making saliva. When that happens, your parotid gland might feel hard, but it may no longer be painful or swollen.  Diagnosing parotid duct obstruction  Your healthcare provider will ask about your health history. He or she will give you a physical exam. This will include an exam of the inside of your mouth. Your healthcare provider may touch the skin outside your parotid gland to see if its sore. In some cases, the stone can be felt during the exam.  In some cases, an imaging test is needed to help view the gland and look for stones. You may have a test such as:  · CT scan. This uses a series of X-rays and a computer to make detailed images of the body.  · X-rays. Small amounts of radiation create images of your organs and other structures inside your body.  · Ultrasound. This uses sound waves and a computer to make images of tissue and organs in your body.  · Sialography. This is X-rays of your salivary gland. It may be better at finding causes of a blocked duct other than stones.  Date Last Reviewed: 5/6/2015 © 2000-2017 [facility]. [address] [place], [place] [id number]. All rights reserved. This information is not intended as a substitute for professional medical care. Always follow your healthcare professional's instructions.        Treatment for Parotid Duct Obstruction    Parotid duct obstruction is when part of your parotid duct becomes blocked. The parotid duct is a small tube that leads from a gland that makes saliva. The duct sends the saliva into your mouth. When its blocked, saliva cant flow normally.  Types of treatment  You may start with treatments such as:  · Drinking more water  · Applying moist heat to the area  · Massaging the gland and duct  · Sucking on candies to create saliva secretion  · Using pain medicines  · Stopping use of any medicines that lower the amount of saliva you make, if possible  Many symptoms go away quickly with these types of treatments. If  your symptoms dont get better, you may need treatments such as:  · Lithotripsy, which uses shock waves to break up the stone  · Wire basket retrieval, which removes the stone through the duct  · Sialoendoscopy, which also removes the stone through the duct  · Open surgery, which may include removal of the parotid duct, if these other methods dont work  Your parotid gland should work as normal after the blockage is removed.  Possible complications of parotid duct obstruction  Sometimes obstruction of the duct also leads to infection of the gland and duct. This is more common in older adults. If you have an infection, you may have a fever and pain that gets worse. You may need treatment with antibiotics.  Most of the time, this kind of infection soon goes away with antibiotics. In other cases a more severe infection may happen. You may have an infection of the deep layers of the skin. This can lead to an abscess in your gland or neck. If your symptoms dont get better, you may need to see an ear, nose, and throat doctor.  When to call your healthcare provider  Call your healthcare provider right away if you have any of these:  · Fever of 100.4°F (38.0°C) or higher  · Pain that gets worse  · Pain in new areas of your head or neck   Date Last Reviewed: 8/10/2015  © 4897-5558 [employer]. [address] [place] [address], [place] [address]. All rights reserved. This information is not intended as a substitute for professional medical care. Always follow your healthcare professional's instructions.        Salivary Gland Swelling, Uncertain Cause  Salivary glands make saliva in response to food in your mouth. Saliva is mostly water. It also has minerals and proteins that help break down food and keep the mouth and teeth healthy. There are three pairs of salivary glands:  · Parotid glands (in front of the ear)  · Submandibular glands (below the jaw)  · Sublingual glands (below the tongue)  Each gland has a duct  (channel) that carries saliva from the gland into the mouth.   Swelling of the salivary glands can sometimes occur. Causes can include:  · Viral infection (such as childhood mumps)  · Bacterial infections  · Sjögren's syndrome  · Diabetes  · Malnutrition  · Sarcoidosis  · Blockage of the salivary duct (from stones or tumors)  Certain medicines can affect salivary flow. This can lead to swelling of the gland. Be sure to tell your healthcare provider about all of the medicines you take.  Tests are being done to determine the cause of the swelling. These may include blood tests, X-ray, ultrasound, CT scan, or injection of dye into the duct to look for blockage. Treatment depends on the exact cause of the swelling.  Home care  · If the area is painful, you can take over-the-counter medicines, such as acetaminophen or ibuprofen, unless you were prescribed another medicine. Wetting a cloth with warm water and putting it over the affected gland for 10-15 minutes at a time can also help ease pain.  · To help prevent blockages and infections:  ¨ Drink 6-8 glasses of fluid per day (such as water, tea, and clear soup) to keep well hydrated.  ¨ If you smoke, ask your healthcare provider for help to quit. Smoking makes salivary gland stones more likely.  ¨ Maintain good dental hygiene. Brush and floss your teeth daily. See your dentist for regular cleanings.  Follow-up care  Follow up with your healthcare provider or as advised. See your healthcare provider for further exams and testing. If you have been referred to a specialist, make an appointment promptly.  When to seek medical advice  Call your healthcare provider if any of the following occur:  · Increasing pain or swelling in the gland  · Inability to open mouth or pain when opening mouth  · Fever of 100.4°F (38ºC) or higher, or as directed by your healthcare provider  · Redness over the gland  · Pus draining into the mouth  · Trouble breathing or swallowing  · Any new  symptoms  Date Last Reviewed: 5/4/2015  © 2695-0151 [employer]. [address] [place] [address], [place] [address]. All rights reserved. This information is not intended as a substitute for professional medical care. Always follow your healthcare professional's instructions.

## 2020-11-09 NOTE — PROGRESS NOTES
Subjective:      Patient ID: Mirian Carbajal is a 25 y.o. female.    Chief Complaint: mouth inflammation    Disclaimer:  This note is prepared using voice recognition software and as such is likely to have errors and has not been proof read. Please contact me for questions.     The patient location is:home  The chief complaint leading to consultation is: followup / my chart request/ severe ha  Visit type: Virtual visit with synchronous audio and video  Total time spent with patient:1205pm- 1215pm     Each patient to whom he or she provides medical services by telemedicine is:  (1) informed of the relationship between the physician and patient and the respective role of any other health care provider with respect to management of the patient; and (2) notified that he or she may decline to receive medical services by telemedicine and may withdraw from such care at any time.    Notes:   Mirian Carbajal is a 25 y.o. female who presents or severe pain on the left jaw and mouth area.  Reports that started about 4-5 days ago.  Has some swelling.  Hurts to open her jaw.  Reports no issues with her teeth but actually more of a discomfort in the gland area.  Cannot open her mouth without significant discomfort.  Reports it is posterior to the wisdom teeth in this area as well.  No fever or chills but the pain is quite severe.  Has been trying to drink more liquids.    No new vitamins or supplements.       Lab Results   Component Value Date    WBC 7.17 05/27/2020    HGB 12.5 05/27/2020    HCT 41.3 05/27/2020     (H) 05/27/2020    CHOL 190 12/06/2018    TRIG 217 (H) 12/06/2018    HDL 38 (L) 12/06/2018    ALT 21 05/27/2020    AST 19 05/27/2020     05/27/2020    K 4.2 05/27/2020     05/27/2020    CREATININE 0.8 05/27/2020    BUN 9 05/27/2020    CO2 25 05/27/2020    TSH 1.964 05/27/2020    HGBA1C 5.6 12/06/2018       US Abdomen Limited  Narrative: EXAMINATION:  US ABDOMEN LIMITED    CLINICAL  HISTORY:  Epigastric pain    TECHNIQUE:  Limited ultrasound of the right upper quadrant of the abdomen (including pancreas, liver, gallbladder, common bile duct, and spleen) was performed.    COMPARISON:  None.    FINDINGS:  Pancreas normal in appearance.    Gallbladder well distended with no evidence cholelithiasis or cholecystitis.  Wall thickness normal at 1.4 mm.  Negative ultrasonic Gonzalez sign.    No intra or extrahepatic biliary dilatation.  Common hepatic duct 5.8 mm.    Liver is borderline enlarged measuring 17.0 cm craniocaudal length.  Uniform normal echotexture throughout with no cystic or solid lesion.    Right kidney normal in appearance measuring 12.2 cm in length.  Cortex well maintained and smoothly marginated bilaterally.  No hydronephrosis or nephrolithiasis.  Impression: Borderline hepatomegaly.  Otherwise unremarkable appearance of the liver.    No other significant findings.    Electronically signed by: Breezy Jacques MD  Date:    10/11/2019  Time:    13:24        Review of Systems   Constitutional: Negative for activity change and unexpected weight change.   HENT: Positive for ear pain, facial swelling and trouble swallowing. Negative for dental problem, hearing loss and rhinorrhea.         Neck pain, mouth jaw pain, ear pain   Eyes: Negative for discharge and visual disturbance.   Respiratory: Negative for chest tightness and wheezing.    Cardiovascular: Negative for chest pain and palpitations.   Gastrointestinal: Negative for blood in stool, constipation, diarrhea and vomiting.   Endocrine: Negative for polydipsia and polyuria.   Genitourinary: Negative for difficulty urinating, dysuria, hematuria and menstrual problem.   Musculoskeletal: Positive for neck pain. Negative for arthralgias and joint swelling.   Neurological: Negative for weakness and headaches.   Psychiatric/Behavioral: Negative for confusion and dysphoric mood.     Objective:   There were no vitals filed for this  visit.  Physical Exam  Vitals signs reviewed.   Constitutional:       General: She is awake. She is not in acute distress.     Appearance: Normal appearance. She is well-developed, well-groomed and normal weight. She is not ill-appearing.   HENT:      Head: Normocephalic and atraumatic.      Jaw: Tenderness and pain on movement present.      Salivary Glands: Right salivary gland is not diffusely enlarged or tender. Left salivary gland is diffusely enlarged and tender.        Right Ear: External ear normal.      Left Ear: External ear normal.      Nose: Nose normal.      Mouth/Throat:      Lips: Pink.   Eyes:      Conjunctiva/sclera: Conjunctivae normal.   Pulmonary:      Effort: Pulmonary effort is normal.   Neurological:      Mental Status: She is alert.   Psychiatric:         Attention and Perception: Attention and perception normal. She is attentive.         Mood and Affect: Mood and affect normal. Mood is not anxious or depressed. Affect is not labile, blunt, angry or inappropriate.         Speech: Speech normal. She is communicative. Speech is not rapid and pressured, delayed, slurred or tangential.         Behavior: Behavior normal. Behavior is not agitated, slowed, aggressive, withdrawn, hyperactive or combative. Behavior is cooperative.         Thought Content: Thought content normal. Thought content is not paranoid or delusional. Thought content does not include homicidal or suicidal ideation. Thought content does not include homicidal or suicidal plan.         Cognition and Memory: Cognition and memory normal. Memory is not impaired. She does not exhibit impaired recent memory or impaired remote memory.         Judgment: Judgment normal. Judgment is not impulsive or inappropriate.       Assessment:     1. Parotiditis      Plan:   Mirian was seen today for mouth inflammation.    Diagnoses and all orders for this visit:    Parotiditis  Comments:  New, given patient instructions.  Will go ahead and treat with  Augmentin and prednisone for 3 days follow-up with ENT if not improving, sour lozenges, liquids   Orders:  -     Ambulatory referral/consult to ENT; Future    Other orders  -     predniSONE (DELTASONE) 20 MG tablet; Take 2 tablets (40 mg total) by mouth once daily. for 3 days  -     amoxicillin-clavulanate 875-125mg (AUGMENTIN) 875-125 mg per tablet; Take 1 tablet by mouth every 12 (twelve) hours.            No follow-ups on file.    Patient Instructions       Understanding Parotid Duct Obstruction    Parotid duct obstruction is when part of your parotid duct becomes blocked. The parotid duct is a small tube that leads from a gland that makes saliva. The duct sends the saliva into your mouth. When its blocked, saliva cant flow normally.  How the saliva gland system works  The parotid gland is 1 of the salivary glands. The glands make saliva, the watery substance used to moisten your mouth and start the digestion process. The parotid gland sits at the back of your lower jaw. From there, saliva travels through a tube called the parotid duct. The saliva drains from the end of the duct and into your mouth.  Your salivary glands start to make saliva when youre eating. If the parotid duct is blocked, the saliva can start to back up into the parotid gland. This can cause pain and swelling. In some cases the gland and duct can become infected.  What causes parotid duct obstruction?  Parotid duct obstruction is most often caused by salivary gland stones. These are tiny stones made of calcium and other minerals. Youre more likely to have salivary gland stones if you:  · Have an infection in the parotid gland  · Dont have enough water in your body (dehydration)  · Have been taking water pills (diuretic medicine)  · Have taken anticholinergic medicine, such as atropine  · Have had injury to the duct  · Have gout  · Have smoked or still smoke  Parotid duct obstruction can also be caused by:  · Scar tissue  · Mucous  plugs  · Abnormal growth of cells  Symptoms of parotid duct obstruction  Symptoms can include pain and swelling near the back of the jaw. Some people have only swelling or only pain. Symptoms may often come and go. They are often worse during eating, when the salivary gland makes more saliva.  Sometimes an object only blocks the parotid duct off and on. If this is the case, you may go without symptoms for days or weeks.  If your gland stays blocked for a long time, it may stop making saliva. When that happens, your parotid gland might feel hard, but it may no longer be painful or swollen.  Diagnosing parotid duct obstruction  Your healthcare provider will ask about your health history. He or she will give you a physical exam. This will include an exam of the inside of your mouth. Your healthcare provider may touch the skin outside your parotid gland to see if its sore. In some cases, the stone can be felt during the exam.  In some cases, an imaging test is needed to help view the gland and look for stones. You may have a test such as:  · CT scan. This uses a series of X-rays and a computer to make detailed images of the body.  · X-rays. Small amounts of radiation create images of your organs and other structures inside your body.  · Ultrasound. This uses sound waves and a computer to make images of tissue and organs in your body.  · Sialography. This is X-rays of your salivary gland. It may be better at finding causes of a blocked duct other than stones.  Date Last Reviewed: 5/6/2015  © 8388-8532 [employer]. [address] [place] [address], [place] [address]. All rights reserved. This information is not intended as a substitute for professional medical care. Always follow your healthcare professional's instructions.        Treatment for Parotid Duct Obstruction    Parotid duct obstruction is when part of your parotid duct becomes blocked. The parotid duct is a small tube that leads from a gland that makes  saliva. The duct sends the saliva into your mouth. When its blocked, saliva cant flow normally.  Types of treatment  You may start with treatments such as:  · Drinking more water  · Applying moist heat to the area  · Massaging the gland and duct  · Sucking on candies to create saliva secretion  · Using pain medicines  · Stopping use of any medicines that lower the amount of saliva you make, if possible  Many symptoms go away quickly with these types of treatments. If your symptoms dont get better, you may need treatments such as:  · Lithotripsy, which uses shock waves to break up the stone  · Wire basket retrieval, which removes the stone through the duct  · Sialoendoscopy, which also removes the stone through the duct  · Open surgery, which may include removal of the parotid duct, if these other methods dont work  Your parotid gland should work as normal after the blockage is removed.  Possible complications of parotid duct obstruction  Sometimes obstruction of the duct also leads to infection of the gland and duct. This is more common in older adults. If you have an infection, you may have a fever and pain that gets worse. You may need treatment with antibiotics.  Most of the time, this kind of infection soon goes away with antibiotics. In other cases a more severe infection may happen. You may have an infection of the deep layers of the skin. This can lead to an abscess in your gland or neck. If your symptoms dont get better, you may need to see an ear, nose, and throat doctor.  When to call your healthcare provider  Call your healthcare provider right away if you have any of these:  · Fever of 100.4°F (38.0°C) or higher  · Pain that gets worse  · Pain in new areas of your head or neck   Date Last Reviewed: 8/10/2015  © 2201-2181 [employer]. [address] [place] [address], [place] [address]. All rights reserved. This information is not intended as a substitute for professional medical care. Always  follow your healthcare professional's instructions.        Salivary Gland Swelling, Uncertain Cause  Salivary glands make saliva in response to food in your mouth. Saliva is mostly water. It also has minerals and proteins that help break down food and keep the mouth and teeth healthy. There are three pairs of salivary glands:  · Parotid glands (in front of the ear)  · Submandibular glands (below the jaw)  · Sublingual glands (below the tongue)  Each gland has a duct (channel) that carries saliva from the gland into the mouth.   Swelling of the salivary glands can sometimes occur. Causes can include:  · Viral infection (such as childhood mumps)  · Bacterial infections  · Sjögren's syndrome  · Diabetes  · Malnutrition  · Sarcoidosis  · Blockage of the salivary duct (from stones or tumors)  Certain medicines can affect salivary flow. This can lead to swelling of the gland. Be sure to tell your healthcare provider about all of the medicines you take.  Tests are being done to determine the cause of the swelling. These may include blood tests, X-ray, ultrasound, CT scan, or injection of dye into the duct to look for blockage. Treatment depends on the exact cause of the swelling.  Home care  · If the area is painful, you can take over-the-counter medicines, such as acetaminophen or ibuprofen, unless you were prescribed another medicine. Wetting a cloth with warm water and putting it over the affected gland for 10-15 minutes at a time can also help ease pain.  · To help prevent blockages and infections:  ¨ Drink 6-8 glasses of fluid per day (such as water, tea, and clear soup) to keep well hydrated.  ¨ If you smoke, ask your healthcare provider for help to quit. Smoking makes salivary gland stones more likely.  ¨ Maintain good dental hygiene. Brush and floss your teeth daily. See your dentist for regular cleanings.  Follow-up care  Follow up with your healthcare provider or as advised. See your healthcare provider for  further exams and testing. If you have been referred to a specialist, make an appointment promptly.  When to seek medical advice  Call your healthcare provider if any of the following occur:  · Increasing pain or swelling in the gland  · Inability to open mouth or pain when opening mouth  · Fever of 100.4°F (38ºC) or higher, or as directed by your healthcare provider  · Redness over the gland  · Pus draining into the mouth  · Trouble breathing or swallowing  · Any new symptoms  Date Last Reviewed: 5/4/2015  © 0108-5553 [employer]. [address] [place] [address], [place] [address]. All rights reserved. This information is not intended as a substitute for professional medical care. Always follow your healthcare professional's instructions.

## 2020-11-17 ENCOUNTER — OFFICE VISIT (OUTPATIENT)
Dept: PRIMARY CARE CLINIC | Facility: CLINIC | Age: 25
End: 2020-11-17
Payer: COMMERCIAL

## 2020-11-17 DIAGNOSIS — F41.0 PANIC DISORDER: Primary | ICD-10-CM

## 2020-11-17 DIAGNOSIS — G47.9 SLEEP DISORDER: ICD-10-CM

## 2020-11-17 PROCEDURE — 99214 PR OFFICE/OUTPT VISIT, EST, LEVL IV, 30-39 MIN: ICD-10-PCS | Mod: 95,,, | Performed by: FAMILY MEDICINE

## 2020-11-17 PROCEDURE — 99214 OFFICE O/P EST MOD 30 MIN: CPT | Mod: 95,,, | Performed by: FAMILY MEDICINE

## 2020-11-17 RX ORDER — BUSPIRONE HYDROCHLORIDE 10 MG/1
TABLET ORAL
Qty: 60 TABLET | Refills: 3 | Status: SHIPPED | OUTPATIENT
Start: 2020-11-17

## 2020-11-17 NOTE — PROGRESS NOTES
Subjective:      Patient ID: Mirian Carbajal is a 25 y.o. female.    Chief Complaint: Anxiety    Disclaimer:  This note is prepared using voice recognition software and as such is likely to have errors and has not been proof read. Please contact me for questions.     The patient location is:home  The chief complaint leading to consultation is: followup / my chart request/ anxiety  Visit type: Virtual visit with synchronous audio and video  Total time spent with patient:1214pm -1236pm     Each patient to whom he or she provides medical services by telemedicine is:  (1) informed of the relationship between the physician and patient and the respective role of any other health care provider with respect to management of the patient; and (2) notified that he or she may decline to receive medical services by telemedicine and may withdraw from such care at any time.    Notes:   Mirian Carbajal is a 25 y.o. female who presents for anxiety and depression. Was told by her  to talk to me. Having bad panic attacks. A lot going on in life. With financial struggles, pandemic.  Feels like its been going on for a really long time. Never really said anything about it in the past. This past week got only a few hours of sleep. Only every few days.  is not really sleeping either. Was just being anxious but now waking up out of sleep, screaming and crying and panicing out of sleep. Took a social media break.   Got to a therapist. Doesn't really like her but still going. Trying to exercise. Can't sleep.  With getting more anxiou can't really breathe. Feels a heaviness in her chest.  Feels embarrassed about it as well. Can't control her thoughts really.  Started with [facility]. Only went 1 time. Is expensive at $85 per visit and only cash pay and thus not really going very often.  Recommended a few apps to download. To listen to medication and breathing.  With getting in bed, cuts phone off, but can't shut  "her mind down. At work she is busy and can't take mind off. With going to bed not certain.   Feels like more instability and financial issues. Going through a bankruptcy. Just got a new car this past weekend. surrived a whole year without a vehicle.  lost his job at beginnning of the pandemic.  Now getting another job. Just fear of losing everything. Thinking something bad is about to happen.      To download "calm" and "fear" apps. And tries to use them prior to go to sleep. Is trying to do guided breathing to help relax and does this for 30min.   Doesn't want anything addictive. Needs to be able to use as needed as well.     Parotid gland issues are better.   Still can't chew as well on that side but is healing and not swollen. Has to careful with what she is eating. But has improved.   No new vitamins or supplements. Does try magnesium and melatonin for sleep and not helping thus far.       Lab Results   Component Value Date    WBC 7.17 05/27/2020    HGB 12.5 05/27/2020    HCT 41.3 05/27/2020     (H) 05/27/2020    CHOL 190 12/06/2018    TRIG 217 (H) 12/06/2018    HDL 38 (L) 12/06/2018    ALT 21 05/27/2020    AST 19 05/27/2020     05/27/2020    K 4.2 05/27/2020     05/27/2020    CREATININE 0.8 05/27/2020    BUN 9 05/27/2020    CO2 25 05/27/2020    TSH 1.964 05/27/2020    HGBA1C 5.6 12/06/2018       US Abdomen Limited  Narrative: EXAMINATION:  US ABDOMEN LIMITED    CLINICAL HISTORY:  Epigastric pain    TECHNIQUE:  Limited ultrasound of the right upper quadrant of the abdomen (including pancreas, liver, gallbladder, common bile duct, and spleen) was performed.    COMPARISON:  None.    FINDINGS:  Pancreas normal in appearance.    Gallbladder well distended with no evidence cholelithiasis or cholecystitis.  Wall thickness normal at 1.4 mm.  Negative ultrasonic Gonzalez sign.    No intra or extrahepatic biliary dilatation.  Common hepatic duct 5.8 mm.    Liver is borderline enlarged measuring " 17.0 cm craniocaudal length.  Uniform normal echotexture throughout with no cystic or solid lesion.    Right kidney normal in appearance measuring 12.2 cm in length.  Cortex well maintained and smoothly marginated bilaterally.  No hydronephrosis or nephrolithiasis.  Impression: Borderline hepatomegaly.  Otherwise unremarkable appearance of the liver.    No other significant findings.    Electronically signed by: Breezy Jacques MD  Date:    10/11/2019  Time:    13:24        Review of Systems   Constitutional: Positive for fatigue. Negative for activity change and unexpected weight change.   HENT: Negative for hearing loss, rhinorrhea and trouble swallowing.    Eyes: Negative for discharge and visual disturbance.   Respiratory: Positive for chest tightness. Negative for wheezing.    Cardiovascular: Positive for chest pain and palpitations.   Gastrointestinal: Positive for vomiting. Negative for blood in stool, constipation and diarrhea.   Endocrine: Negative for polydipsia and polyuria.   Genitourinary: Negative for difficulty urinating, dysuria, hematuria and menstrual problem.   Musculoskeletal: Negative for arthralgias, joint swelling and neck pain.   Neurological: Positive for headaches. Negative for weakness.   Psychiatric/Behavioral: Positive for dysphoric mood and sleep disturbance. Negative for confusion. The patient is nervous/anxious.      Objective:   There were no vitals filed for this visit.  Physical Exam  Vitals signs reviewed.   Constitutional:       General: She is awake. She is not in acute distress.     Appearance: Normal appearance. She is well-developed and well-groomed. She is obese. She is not ill-appearing.   HENT:      Head: Normocephalic and atraumatic.      Right Ear: External ear normal.      Left Ear: External ear normal.      Nose: Nose normal.      Mouth/Throat:      Lips: Pink.   Eyes:      Conjunctiva/sclera: Conjunctivae normal.   Pulmonary:      Effort: Pulmonary effort is normal.    Neurological:      Mental Status: She is alert.   Psychiatric:         Attention and Perception: Attention and perception normal. She is attentive.         Mood and Affect: Affect normal. Mood is anxious. Mood is not depressed. Affect is not labile, blunt, angry, tearful or inappropriate.         Speech: Speech normal. She is communicative. Speech is not rapid and pressured, delayed, slurred or tangential.         Behavior: Behavior normal. Behavior is not agitated, slowed, aggressive, withdrawn, hyperactive or combative. Behavior is cooperative.         Thought Content: Thought content normal. Thought content is not paranoid or delusional. Thought content does not include homicidal or suicidal ideation. Thought content does not include homicidal or suicidal plan.         Cognition and Memory: Cognition and memory normal. Cognition is not impaired. Memory is not impaired. She does not exhibit impaired recent memory or impaired remote memory.         Judgment: Judgment normal. Judgment is not impulsive or inappropriate.       Assessment:     1. Panic disorder    2. Sleep disorder      Plan:   Mirian was seen today for anxiety.    Diagnoses and all orders for this visit:    Panic disorder  Comments:  new worse with sleep and increased fear, financial strain. start buspar. refer for counseling. followup in 4 weeks.   Orders:  -     Ambulatory referral/consult to Psychiatry; Future    Sleep disorder  Comments:  new worse with sleep and increased fear, financial strain. start buspar. refer for counseling. followup in 4 weeks.   Orders:  -     Ambulatory referral/consult to Psychiatry; Future    Other orders  -     busPIRone (BUSPAR) 10 MG tablet; Take 1 tablet (10 mg total) by mouth every evening. May also take 0.5 tablets (5 mg total) 2 (two) times daily as needed.            No follow-ups on file.    There are no Patient Instructions on file for this visit.

## 2020-11-19 ENCOUNTER — PATIENT MESSAGE (OUTPATIENT)
Dept: PRIMARY CARE CLINIC | Facility: CLINIC | Age: 25
End: 2020-11-19

## 2020-11-19 ENCOUNTER — OFFICE VISIT (OUTPATIENT)
Dept: PSYCHIATRY | Facility: CLINIC | Age: 25
End: 2020-11-19
Payer: COMMERCIAL

## 2020-11-19 DIAGNOSIS — F34.1 DYSTHYMIA: Primary | ICD-10-CM

## 2020-11-19 DIAGNOSIS — G47.9 SLEEP DISORDER: ICD-10-CM

## 2020-11-19 DIAGNOSIS — F41.0 PANIC DISORDER: ICD-10-CM

## 2020-11-19 DIAGNOSIS — F41.1 GENERALIZED ANXIETY DISORDER: ICD-10-CM

## 2020-11-19 PROCEDURE — 90791 PR PSYCHIATRIC DIAGNOSTIC EVALUATION: ICD-10-PCS | Mod: 95,,, | Performed by: SOCIAL WORKER

## 2020-11-19 PROCEDURE — 90791 PSYCH DIAGNOSTIC EVALUATION: CPT | Mod: 95,,, | Performed by: SOCIAL WORKER
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
## 2020-11-30 ENCOUNTER — OFFICE VISIT (OUTPATIENT)
Dept: PSYCHIATRY | Facility: CLINIC | Age: 25
End: 2020-11-30
Payer: COMMERCIAL

## 2020-11-30 DIAGNOSIS — Z63.0 MARITAL CONFLICT: ICD-10-CM

## 2020-11-30 DIAGNOSIS — F41.1 GENERALIZED ANXIETY DISORDER: Primary | ICD-10-CM

## 2020-11-30 DIAGNOSIS — F41.0 PANIC DISORDER: ICD-10-CM

## 2020-11-30 PROCEDURE — 90834 PR PSYCHOTHERAPY W/PATIENT, 45 MIN: ICD-10-PCS | Mod: 95,,, | Performed by: SOCIAL WORKER

## 2020-11-30 PROCEDURE — 90834 PSYTX W PT 45 MINUTES: CPT | Mod: 95,,, | Performed by: SOCIAL WORKER

## 2020-11-30 SDOH — SOCIAL DETERMINANTS OF HEALTH (SDOH): PROBLEMS IN RELATIONSHIP WITH SPOUSE OR PARTNER: Z63.0

## 2020-11-30 NOTE — PROGRESS NOTES
Individual Psychotherapy (PhD/LCSW)    11/30/2020    Site:  [place]         Each patient to whom she provides medical services by telemedicine is:  (1) informed of the relationship between the physician and patient and the respective role of any other health care provider with respect to management of the patient; and (2) notified that he or she may decline to receive medical services by telemedicine and may withdraw from such care at any time.     Virtual visit with synchronous audio and video    Pt was calling from her home in [place], [place]    Therapeutic Intervention: Met with patient.  Outpatient - Behavior modifying psychotherapy 45 min - CPT code 24995    Chief complaint/reason for encounter: anxiety and interpersonal     Interval history and content of current session: Pt has been doing well with respect to her anxiety over the past couple of weeks.  She is using meditation daily and is exercising daily as well.  She takes meds at night because she feels foggy if she takes them during the day.  She has reduced caffeine after 2 signficantly and does not bring her phone into the bedroom so she is practicing good sleep hygiene.  Pt stated that sometimes she finds on saturdays she never gets our of the bed.  She is somewhat concerned with that but feels it is better than staying in bed all week in an effort to avoid her anxiety.  Pt talked more about the anxiety in her marriage.  Her distrust of her  and his lack of accountability regarding where he spends his money.  Admitting that she knows she may not want to know the answer to that questions.   living in extreme religiosity.  He resists therapy that is not Rastafarian based.  She resists Rastafarian counselor for marriage counseling because she feels the answer will be read your bible more.  She feels he is not being honest with a lot of his feelings because she would see it as a weakness in his trust in God to admit he is overwhelmed.   They were able to purchase a car so that has helped matters a great deal.  Told her we do have some male therapist that may be more appealing for him to try. He discounts her financial contribution as she pays for his health insurance as well as that of his son through her employer.  At the cost of $1200 each month.  They have never combined monies.      Treatment plan:  · Target symptoms: anxiety   · Why chosen therapy is appropriate versus another modality: relevant to diagnosis, patient responds to this modality, evidence based practice  · Outcome monitoring methods: self-report, observation  · Therapeutic intervention type: behavior modifying psychotherapy, supportive psychotherapy    Risk parameters:  Patient reports no suicidal ideation  Patient reports no homicidal ideation  Patient reports no self-injurious behavior  Patient reports no violent behavior    Verbal deficits: None    Patient's response to intervention:  The patient's response to intervention is accepting.    Progress toward goals and other mental status changes:  The patient's progress toward goals is good.    Diagnosis:     ICD-10-CM ICD-9-CM   1. Generalized anxiety disorder  F41.1 300.02   2. Panic disorder  F41.0 300.01   3. Marital conflict  Z63.0 V61.10       Plan:  individual psychotherapy    Return to clinic: 2 weeks    Length of Service (minutes): 45     Shea Burks   11/30/2020   12:28 PM

## 2020-12-21 ENCOUNTER — OFFICE VISIT (OUTPATIENT)
Dept: PRIMARY CARE CLINIC | Facility: CLINIC | Age: 25
End: 2020-12-21
Payer: COMMERCIAL

## 2020-12-21 DIAGNOSIS — F33.1 MODERATE EPISODE OF RECURRENT MAJOR DEPRESSIVE DISORDER: Primary | ICD-10-CM

## 2020-12-21 DIAGNOSIS — E55.9 VITAMIN D DEFICIENCY: ICD-10-CM

## 2020-12-21 DIAGNOSIS — F41.1 GAD (GENERALIZED ANXIETY DISORDER): ICD-10-CM

## 2020-12-21 PROCEDURE — 99214 OFFICE O/P EST MOD 30 MIN: CPT | Mod: 95,,, | Performed by: FAMILY MEDICINE

## 2020-12-21 PROCEDURE — 99214 PR OFFICE/OUTPT VISIT, EST, LEVL IV, 30-39 MIN: ICD-10-PCS | Mod: 95,,, | Performed by: FAMILY MEDICINE

## 2020-12-21 RX ORDER — SERTRALINE HYDROCHLORIDE 50 MG/1
50 TABLET, FILM COATED ORAL DAILY
Qty: 30 TABLET | Refills: 11 | Status: SHIPPED | OUTPATIENT
Start: 2020-12-21

## 2020-12-21 RX ORDER — ERGOCALCIFEROL 1.25 MG/1
50000 CAPSULE ORAL WEEKLY
Qty: 4 CAPSULE | Refills: 11 | Status: SHIPPED | OUTPATIENT
Start: 2020-12-21 | End: 2021-01-20

## 2020-12-21 NOTE — PROGRESS NOTES
Subjective:      Patient ID: Mirian Carbajal is a 25 y.o. female.    Chief Complaint: Anxiety    Disclaimer:  This note is prepared using voice recognition software and as such is likely to have errors and has not been proof read. Please contact me for questions.     The patient location is:home  The chief complaint leading to consultation is: followup / my chart request/ anxiety  Visit type: Virtual visit with synchronous audio and video  Total time spent with patient:329pm -341pm   Each patient to whom he or she provides medical services by telemedicine is:  (1) informed of the relationship between the physician and patient and the respective role of any other health care provider with respect to management of the patient; and (2) notified that he or she may decline to receive medical services by telemedicine and may withdraw from such care at any time.    Notes:   Mirian Carbajal is a 25 y.o. female who presents for anxiety and depression. Did meet with  2 times with counseling. Did start the buspar initiallly was dizzy but will take 10mg before bed, and then prn as needed.  Not certain with the anxiety.  Has found the BuSpar to be helpful some upon further review less panic attacks but now more depressed at times.  Still lot of life stress issues going on.  Had crying fits. No bad panic attacks. Gets upset at work. Gets hard at work. Hard to concentrate at work. Life stress going.  was trying to encourage her to take some breaks at work. Not working as well. A lot of outside stress.   Hasn't made another appointment with the  but willing to do so.  Has a lot of financial struggles as well also.  Does still try to do meditation and mind fullness.  Has done various apps to count help as well.  Sleep is slightly better.      Doesn't want anything addictive. Needs to be able to use as needed as well.         Lab Results   Component Value Date    WBC 7.17 05/27/2020     HGB 12.5 05/27/2020    HCT 41.3 05/27/2020     (H) 05/27/2020    CHOL 190 12/06/2018    TRIG 217 (H) 12/06/2018    HDL 38 (L) 12/06/2018    ALT 21 05/27/2020    AST 19 05/27/2020     05/27/2020    K 4.2 05/27/2020     05/27/2020    CREATININE 0.8 05/27/2020    BUN 9 05/27/2020    CO2 25 05/27/2020    TSH 1.964 05/27/2020    HGBA1C 5.6 12/06/2018       US Abdomen Limited  Narrative: EXAMINATION:  US ABDOMEN LIMITED    CLINICAL HISTORY:  Epigastric pain    TECHNIQUE:  Limited ultrasound of the right upper quadrant of the abdomen (including pancreas, liver, gallbladder, common bile duct, and spleen) was performed.    COMPARISON:  None.    FINDINGS:  Pancreas normal in appearance.    Gallbladder well distended with no evidence cholelithiasis or cholecystitis.  Wall thickness normal at 1.4 mm.  Negative ultrasonic Gonzalez sign.    No intra or extrahepatic biliary dilatation.  Common hepatic duct 5.8 mm.    Liver is borderline enlarged measuring 17.0 cm craniocaudal length.  Uniform normal echotexture throughout with no cystic or solid lesion.    Right kidney normal in appearance measuring 12.2 cm in length.  Cortex well maintained and smoothly marginated bilaterally.  No hydronephrosis or nephrolithiasis.  Impression: Borderline hepatomegaly.  Otherwise unremarkable appearance of the liver.    No other significant findings.    Electronically signed by: Breezy Jacques MD  Date:    10/11/2019  Time:    13:24        Review of Systems   Constitutional: Negative for activity change and unexpected weight change.   HENT: Negative for hearing loss, rhinorrhea and trouble swallowing.    Eyes: Negative for discharge and visual disturbance.   Respiratory: Negative for chest tightness and wheezing.    Cardiovascular: Positive for chest pain and palpitations.   Gastrointestinal: Negative for blood in stool, constipation, diarrhea and vomiting.   Endocrine: Negative for polydipsia and polyuria.   Genitourinary:  Negative for difficulty urinating, dysuria, hematuria and menstrual problem.   Musculoskeletal: Negative for arthralgias, joint swelling and neck pain.   Neurological: Positive for headaches. Negative for weakness.   Psychiatric/Behavioral: Positive for decreased concentration and dysphoric mood. Negative for confusion and sleep disturbance. The patient is nervous/anxious.      Objective:   There were no vitals filed for this visit.  Physical Exam  Vitals signs reviewed.   Constitutional:       General: She is awake. She is not in acute distress.     Appearance: Normal appearance. She is well-developed and well-groomed. She is obese. She is not ill-appearing.   HENT:      Head: Normocephalic and atraumatic.      Right Ear: External ear normal.      Left Ear: External ear normal.      Nose: Nose normal.      Mouth/Throat:      Lips: Pink.   Eyes:      Conjunctiva/sclera: Conjunctivae normal.   Pulmonary:      Effort: Pulmonary effort is normal.   Neurological:      Mental Status: She is alert.   Psychiatric:         Attention and Perception: Attention and perception normal. She is attentive.         Mood and Affect: Mood is depressed. Mood is not anxious. Affect is flat. Affect is not labile, blunt, angry or inappropriate.         Speech: Speech normal. She is communicative. Speech is not rapid and pressured, delayed, slurred or tangential.         Behavior: Behavior normal. Behavior is not agitated, slowed, aggressive, withdrawn, hyperactive or combative. Behavior is cooperative.         Thought Content: Thought content normal. Thought content is not paranoid or delusional. Thought content does not include homicidal or suicidal ideation. Thought content does not include homicidal or suicidal plan.         Cognition and Memory: Cognition and memory normal. Memory is not impaired. She does not exhibit impaired recent memory or impaired remote memory.         Judgment: Judgment normal. Judgment is not impulsive or  inappropriate.       Assessment:     1. Moderate episode of recurrent major depressive disorder    2. KAYODE (generalized anxiety disorder)    3. Vitamin D deficiency      Plan:   Mirian was seen today for anxiety.    Diagnoses and all orders for this visit:    Moderate episode of recurrent major depressive disorder-not controlled add Zoloft 25 mg daily x3 days and increase to 50 mg continue with BuSpar 10 mg at night recommend follow-up visit for counseling patient to schedule    KAYODE (generalized anxiety disorder) improved with BuSpar however worse and higher life stress full events, will add sertraline continue and reset up appointment with counselor regularly    Vitamin D deficiency also noted willing to do prescription medications and weekly supplementation    Other orders  -     sertraline (ZOLOFT) 50 MG tablet; Take 1 tablet (50 mg total) by mouth once daily.  -     ergocalciferol (ERGOCALCIFEROL) 50,000 unit Cap; Take 1 capsule (50,000 Units total) by mouth once a week.      Consideration also for seasonal affective disorder for the patient since she does find that there are times a year that gets worse especially with lower vitamin-D levels in the winter as well patient to keep chart of symptoms as well as time of year symptoms.      Follow up in about 6 weeks (around 2/1/2021) for f/u [facility] Dr Alcantar/ depression, anxiety, vit D .    There are no Patient Instructions on file for this visit.

## 2021-02-12 ENCOUNTER — PATIENT MESSAGE (OUTPATIENT)
Dept: ADMINISTRATIVE | Facility: HOSPITAL | Age: 26
End: 2021-02-12

## 2021-02-12 ENCOUNTER — PATIENT OUTREACH (OUTPATIENT)
Dept: ADMINISTRATIVE | Facility: HOSPITAL | Age: 26
End: 2021-02-12
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
